# Patient Record
Sex: MALE | Race: BLACK OR AFRICAN AMERICAN | NOT HISPANIC OR LATINO | Employment: FULL TIME | URBAN - METROPOLITAN AREA
[De-identification: names, ages, dates, MRNs, and addresses within clinical notes are randomized per-mention and may not be internally consistent; named-entity substitution may affect disease eponyms.]

---

## 2021-07-19 ENCOUNTER — IMMUNIZATION (OUTPATIENT)
Dept: PRIMARY CARE CLINIC | Facility: CLINIC | Age: 51
End: 2021-07-19

## 2021-07-19 DIAGNOSIS — Z23 NEED FOR VACCINATION: Primary | ICD-10-CM

## 2021-07-19 PROCEDURE — 91300 COVID-19, MRNA, LNP-S, PF, 30 MCG/0.3 ML DOSE VACCINE: ICD-10-PCS | Mod: S$GLB,,, | Performed by: FAMILY MEDICINE

## 2021-07-19 PROCEDURE — 0001A COVID-19, MRNA, LNP-S, PF, 30 MCG/0.3 ML DOSE VACCINE: ICD-10-PCS | Mod: CV19,S$GLB,, | Performed by: FAMILY MEDICINE

## 2021-07-19 PROCEDURE — 91300 COVID-19, MRNA, LNP-S, PF, 30 MCG/0.3 ML DOSE VACCINE: CPT | Mod: S$GLB,,, | Performed by: FAMILY MEDICINE

## 2021-07-19 PROCEDURE — 0001A COVID-19, MRNA, LNP-S, PF, 30 MCG/0.3 ML DOSE VACCINE: CPT | Mod: CV19,S$GLB,, | Performed by: FAMILY MEDICINE

## 2021-08-09 ENCOUNTER — IMMUNIZATION (OUTPATIENT)
Dept: PRIMARY CARE CLINIC | Facility: CLINIC | Age: 51
End: 2021-08-09

## 2021-08-09 DIAGNOSIS — Z23 NEED FOR VACCINATION: Primary | ICD-10-CM

## 2021-08-09 PROCEDURE — 91300 COVID-19, MRNA, LNP-S, PF, 30 MCG/0.3 ML DOSE VACCINE: CPT | Mod: S$GLB,,, | Performed by: FAMILY MEDICINE

## 2021-08-09 PROCEDURE — 0002A COVID-19, MRNA, LNP-S, PF, 30 MCG/0.3 ML DOSE VACCINE: ICD-10-PCS | Mod: CV19,S$GLB,, | Performed by: FAMILY MEDICINE

## 2021-08-09 PROCEDURE — 91300 COVID-19, MRNA, LNP-S, PF, 30 MCG/0.3 ML DOSE VACCINE: ICD-10-PCS | Mod: S$GLB,,, | Performed by: FAMILY MEDICINE

## 2021-08-09 PROCEDURE — 0002A COVID-19, MRNA, LNP-S, PF, 30 MCG/0.3 ML DOSE VACCINE: CPT | Mod: CV19,S$GLB,, | Performed by: FAMILY MEDICINE

## 2022-02-10 ENCOUNTER — TELEPHONE (OUTPATIENT)
Dept: ENDOSCOPY | Facility: HOSPITAL | Age: 52
End: 2022-02-10
Payer: MEDICAID

## 2022-04-01 ENCOUNTER — OFFICE VISIT (OUTPATIENT)
Dept: PRIMARY CARE CLINIC | Facility: CLINIC | Age: 52
End: 2022-04-01
Payer: MEDICAID

## 2022-04-01 ENCOUNTER — LAB VISIT (OUTPATIENT)
Dept: LAB | Facility: HOSPITAL | Age: 52
End: 2022-04-01
Attending: NURSE PRACTITIONER
Payer: MEDICAID

## 2022-04-01 VITALS
HEIGHT: 74 IN | WEIGHT: 276.81 LBS | SYSTOLIC BLOOD PRESSURE: 153 MMHG | DIASTOLIC BLOOD PRESSURE: 77 MMHG | HEART RATE: 67 BPM | OXYGEN SATURATION: 96 % | BODY MASS INDEX: 35.53 KG/M2 | TEMPERATURE: 98 F

## 2022-04-01 DIAGNOSIS — Z00.00 GENERAL MEDICAL EXAM: ICD-10-CM

## 2022-04-01 DIAGNOSIS — Z13.6 ENCOUNTER FOR LIPID SCREENING FOR CARDIOVASCULAR DISEASE: ICD-10-CM

## 2022-04-01 DIAGNOSIS — Z86.39 HISTORY OF METABOLIC AND NUTRITIONAL DISORDER: ICD-10-CM

## 2022-04-01 DIAGNOSIS — Z11.4 SCREENING FOR HIV (HUMAN IMMUNODEFICIENCY VIRUS): ICD-10-CM

## 2022-04-01 DIAGNOSIS — Z13.220 ENCOUNTER FOR LIPID SCREENING FOR CARDIOVASCULAR DISEASE: ICD-10-CM

## 2022-04-01 DIAGNOSIS — Z11.59 ENCOUNTER FOR HEPATITIS C SCREENING TEST FOR LOW RISK PATIENT: ICD-10-CM

## 2022-04-01 DIAGNOSIS — Z12.12 SCREENING FOR COLORECTAL CANCER: ICD-10-CM

## 2022-04-01 DIAGNOSIS — M79.672 LEFT FOOT PAIN: Primary | ICD-10-CM

## 2022-04-01 DIAGNOSIS — Z12.5 ENCOUNTER FOR PROSTATE CANCER SCREENING: ICD-10-CM

## 2022-04-01 DIAGNOSIS — Z12.11 SCREENING FOR COLORECTAL CANCER: ICD-10-CM

## 2022-04-01 LAB
BASOPHILS # BLD AUTO: 0.02 K/UL (ref 0–0.2)
BASOPHILS NFR BLD: 0.3 % (ref 0–1.9)
DIFFERENTIAL METHOD: NORMAL
EOSINOPHIL # BLD AUTO: 0.2 K/UL (ref 0–0.5)
EOSINOPHIL NFR BLD: 2.9 % (ref 0–8)
ERYTHROCYTE [DISTWIDTH] IN BLOOD BY AUTOMATED COUNT: 12.5 % (ref 11.5–14.5)
HCT VFR BLD AUTO: 44.2 % (ref 40–54)
HGB BLD-MCNC: 14.9 G/DL (ref 14–18)
IMM GRANULOCYTES # BLD AUTO: 0 K/UL (ref 0–0.04)
IMM GRANULOCYTES NFR BLD AUTO: 0 % (ref 0–0.5)
LYMPHOCYTES # BLD AUTO: 1.6 K/UL (ref 1–4.8)
LYMPHOCYTES NFR BLD: 24.7 % (ref 18–48)
MCH RBC QN AUTO: 29.8 PG (ref 27–31)
MCHC RBC AUTO-ENTMCNC: 33.7 G/DL (ref 32–36)
MCV RBC AUTO: 88 FL (ref 82–98)
MONOCYTES # BLD AUTO: 0.7 K/UL (ref 0.3–1)
MONOCYTES NFR BLD: 9.9 % (ref 4–15)
NEUTROPHILS # BLD AUTO: 4.1 K/UL (ref 1.8–7.7)
NEUTROPHILS NFR BLD: 62.2 % (ref 38–73)
NRBC BLD-RTO: 0 /100 WBC
PLATELET # BLD AUTO: 238 K/UL (ref 150–450)
PMV BLD AUTO: 9.7 FL (ref 9.2–12.9)
RBC # BLD AUTO: 5 M/UL (ref 4.6–6.2)
WBC # BLD AUTO: 6.59 K/UL (ref 3.9–12.7)

## 2022-04-01 PROCEDURE — 80061 LIPID PANEL: CPT | Performed by: NURSE PRACTITIONER

## 2022-04-01 PROCEDURE — 3078F PR MOST RECENT DIASTOLIC BLOOD PRESSURE < 80 MM HG: ICD-10-PCS | Mod: CPTII,,, | Performed by: NURSE PRACTITIONER

## 2022-04-01 PROCEDURE — 85025 COMPLETE CBC W/AUTO DIFF WBC: CPT | Performed by: NURSE PRACTITIONER

## 2022-04-01 PROCEDURE — 3077F PR MOST RECENT SYSTOLIC BLOOD PRESSURE >= 140 MM HG: ICD-10-PCS | Mod: CPTII,,, | Performed by: NURSE PRACTITIONER

## 2022-04-01 PROCEDURE — 99999 PR PBB SHADOW E&M-EST. PATIENT-LVL IV: ICD-10-PCS | Mod: PBBFAC,,, | Performed by: NURSE PRACTITIONER

## 2022-04-01 PROCEDURE — 84443 ASSAY THYROID STIM HORMONE: CPT | Performed by: NURSE PRACTITIONER

## 2022-04-01 PROCEDURE — 80053 COMPREHEN METABOLIC PANEL: CPT | Performed by: NURSE PRACTITIONER

## 2022-04-01 PROCEDURE — 84439 ASSAY OF FREE THYROXINE: CPT | Performed by: NURSE PRACTITIONER

## 2022-04-01 PROCEDURE — 36415 COLL VENOUS BLD VENIPUNCTURE: CPT | Mod: PN | Performed by: NURSE PRACTITIONER

## 2022-04-01 PROCEDURE — 83036 HEMOGLOBIN GLYCOSYLATED A1C: CPT | Performed by: NURSE PRACTITIONER

## 2022-04-01 PROCEDURE — 87389 HIV-1 AG W/HIV-1&-2 AB AG IA: CPT | Performed by: NURSE PRACTITIONER

## 2022-04-01 PROCEDURE — 1159F MED LIST DOCD IN RCRD: CPT | Mod: CPTII,,, | Performed by: NURSE PRACTITIONER

## 2022-04-01 PROCEDURE — 3008F PR BODY MASS INDEX (BMI) DOCUMENTED: ICD-10-PCS | Mod: CPTII,,, | Performed by: NURSE PRACTITIONER

## 2022-04-01 PROCEDURE — 99203 OFFICE O/P NEW LOW 30 MIN: CPT | Mod: S$PBB,,, | Performed by: NURSE PRACTITIONER

## 2022-04-01 PROCEDURE — 99203 PR OFFICE/OUTPT VISIT, NEW, LEVL III, 30-44 MIN: ICD-10-PCS | Mod: S$PBB,,, | Performed by: NURSE PRACTITIONER

## 2022-04-01 PROCEDURE — 84153 ASSAY OF PSA TOTAL: CPT | Performed by: NURSE PRACTITIONER

## 2022-04-01 PROCEDURE — 3078F DIAST BP <80 MM HG: CPT | Mod: CPTII,,, | Performed by: NURSE PRACTITIONER

## 2022-04-01 PROCEDURE — 3077F SYST BP >= 140 MM HG: CPT | Mod: CPTII,,, | Performed by: NURSE PRACTITIONER

## 2022-04-01 PROCEDURE — 99214 OFFICE O/P EST MOD 30 MIN: CPT | Mod: PBBFAC,PN | Performed by: NURSE PRACTITIONER

## 2022-04-01 PROCEDURE — 99999 PR PBB SHADOW E&M-EST. PATIENT-LVL IV: CPT | Mod: PBBFAC,,, | Performed by: NURSE PRACTITIONER

## 2022-04-01 PROCEDURE — 3008F BODY MASS INDEX DOCD: CPT | Mod: CPTII,,, | Performed by: NURSE PRACTITIONER

## 2022-04-01 PROCEDURE — 86803 HEPATITIS C AB TEST: CPT | Performed by: NURSE PRACTITIONER

## 2022-04-01 PROCEDURE — 1159F PR MEDICATION LIST DOCUMENTED IN MEDICAL RECORD: ICD-10-PCS | Mod: CPTII,,, | Performed by: NURSE PRACTITIONER

## 2022-04-01 RX ORDER — GUAIFENESIN 600 MG/1
1200 TABLET, EXTENDED RELEASE ORAL 2 TIMES DAILY
COMMUNITY

## 2022-04-01 NOTE — PROGRESS NOTES
Subjective:       Patient ID: Franco Pinto is a 51 y.o. male.    Chief Complaint: Establish Care and Foot Pain      History of Present Illness:   Franco Pinto 51 y.o. male presents today to establish care and right foot/heel pain that has been present for about 3 weeks. Denies any other problems or concerns at this time.     History reviewed. No pertinent past medical history.  History reviewed. No pertinent family history.  Social History     Socioeconomic History    Marital status: Unknown   Tobacco Use    Smoking status: Never Smoker    Smokeless tobacco: Never Used     Social Determinants of Health     Financial Resource Strain: Low Risk     Difficulty of Paying Living Expenses: Not hard at all   Food Insecurity: No Food Insecurity    Worried About Running Out of Food in the Last Year: Never true    Ran Out of Food in the Last Year: Never true   Transportation Needs: No Transportation Needs    Lack of Transportation (Medical): No    Lack of Transportation (Non-Medical): No   Physical Activity: Inactive    Days of Exercise per Week: 0 days    Minutes of Exercise per Session: 0 min   Stress: No Stress Concern Present    Feeling of Stress : Not at all   Social Connections: Moderately Integrated    Frequency of Communication with Friends and Family: Twice a week    Frequency of Social Gatherings with Friends and Family: Once a week    Attends Moravian Services: 1 to 4 times per year    Active Member of Clubs or Organizations: Yes    Attends Club or Organization Meetings: Never    Marital Status: Never    Housing Stability: Unknown    Unable to Pay for Housing in the Last Year: No    Unstable Housing in the Last Year: No     Outpatient Encounter Medications as of 4/1/2022   Medication Sig Dispense Refill    guaiFENesin (MUCINEX) 600 mg 12 hr tablet Take 1,200 mg by mouth 2 (two) times daily.       No facility-administered encounter medications on file as of 4/1/2022.       Review of  "Systems   Constitutional: Negative for appetite change, chills and fever.   HENT: Negative for ear pain, sinus pressure, sore throat and trouble swallowing.    Eyes: Negative for visual disturbance.   Respiratory: Negative for shortness of breath.    Cardiovascular: Negative for chest pain.   Gastrointestinal: Negative for abdominal pain, diarrhea, nausea and vomiting.   Endocrine: Negative for cold intolerance, polyphagia and polyuria.   Genitourinary: Negative for decreased urine volume and dysuria.   Musculoskeletal: Negative for back pain.        Right foot pain     Skin: Negative for rash.   Allergic/Immunologic: Negative for environmental allergies and food allergies.   Neurological: Negative for dizziness, tremors, weakness and numbness.   Hematological: Does not bruise/bleed easily.   Psychiatric/Behavioral: Negative for confusion and hallucinations. The patient is not nervous/anxious and is not hyperactive.    All other systems reviewed and are negative.      Objective:      BP (!) 153/77   Pulse 67   Temp 98.2 °F (36.8 °C) (Temporal)   Ht 6' 2" (1.88 m)   Wt 125.6 kg (276 lb 12.8 oz)   SpO2 96%   BMI 35.54 kg/m²   Physical Exam  Vitals and nursing note reviewed.   Constitutional:       Appearance: He is well-developed.   HENT:      Head: Normocephalic.      Right Ear: External ear normal.      Left Ear: External ear normal.      Nose: Nose normal.   Eyes:      Conjunctiva/sclera: Conjunctivae normal.      Pupils: Pupils are equal, round, and reactive to light.   Neck:      Vascular: No JVD.   Cardiovascular:      Rate and Rhythm: Normal rate and regular rhythm.      Heart sounds: Normal heart sounds.   Pulmonary:      Effort: Pulmonary effort is normal.      Breath sounds: Normal breath sounds. No wheezing.   Abdominal:      General: Bowel sounds are normal.      Palpations: Abdomen is soft.      Tenderness: There is no abdominal tenderness.   Musculoskeletal:      Cervical back: Normal range of " motion and neck supple.      Right foot: Tenderness present.      Left foot: Normal.        Feet:    Lymphadenopathy:      Cervical: No cervical adenopathy.   Skin:     General: Skin is warm and dry.   Neurological:      Mental Status: He is alert and oriented to person, place, and time.   Psychiatric:         Behavior: Behavior normal.         Thought Content: Thought content normal.         Judgment: Judgment normal.         No results found for this or any previous visit.  Assessment:       1. Left foot pain    2. Screening for HIV (human immunodeficiency virus)    3. General medical exam    4. Encounter for hepatitis C screening test for low risk patient    5. Encounter for prostate cancer screening    6. History of metabolic and nutritional disorder    7. Encounter for lipid screening for cardiovascular disease    8. Screening for colorectal cancer        Plan:   Franco was seen today for establish care and foot pain.    Diagnoses and all orders for this visit:    Left foot pain  -     X-Ray Foot AP Bilateral; Future  -     Ambulatory referral/consult to Podiatry; Future    Screening for HIV (human immunodeficiency virus)  -     HIV 1/2 Ag/Ab (4th Gen); Future    General medical exam  -     CBC Auto Differential; Future  -     Comprehensive Metabolic Panel; Future    Encounter for hepatitis C screening test for low risk patient  -     Hepatitis C Antibody; Future    Encounter for prostate cancer screening  -     PSA, Screening; Future    History of metabolic and nutritional disorder  -     TSH; Future  -     T4, Free; Future  -     Hemoglobin A1C; Future    Encounter for lipid screening for cardiovascular disease  -     Lipid Panel; Future    Screening for colorectal cancer  -     Case Request Endoscopy: COLONOSCOPY             [facility]   [address]  [place] [address]  Office [phone]  Fax [phone]

## 2022-04-02 LAB
ALBUMIN SERPL BCP-MCNC: 3.7 G/DL (ref 3.5–5.2)
ALP SERPL-CCNC: 99 U/L (ref 55–135)
ALT SERPL W/O P-5'-P-CCNC: 35 U/L (ref 10–44)
ANION GAP SERPL CALC-SCNC: 6 MMOL/L (ref 8–16)
AST SERPL-CCNC: 26 U/L (ref 10–40)
BILIRUB SERPL-MCNC: 0.5 MG/DL (ref 0.1–1)
BUN SERPL-MCNC: 14 MG/DL (ref 6–20)
CALCIUM SERPL-MCNC: 9.2 MG/DL (ref 8.7–10.5)
CHLORIDE SERPL-SCNC: 108 MMOL/L (ref 95–110)
CHOLEST SERPL-MCNC: 178 MG/DL (ref 120–199)
CHOLEST/HDLC SERPL: 5.7 {RATIO} (ref 2–5)
CO2 SERPL-SCNC: 26 MMOL/L (ref 23–29)
COMPLEXED PSA SERPL-MCNC: 0.39 NG/ML (ref 0–4)
CREAT SERPL-MCNC: 0.9 MG/DL (ref 0.5–1.4)
EST. GFR  (AFRICAN AMERICAN): >60 ML/MIN/1.73 M^2
EST. GFR  (NON AFRICAN AMERICAN): >60 ML/MIN/1.73 M^2
ESTIMATED AVG GLUCOSE: 108 MG/DL (ref 68–131)
GLUCOSE SERPL-MCNC: 93 MG/DL (ref 70–110)
HBA1C MFR BLD: 5.4 % (ref 4–5.6)
HDLC SERPL-MCNC: 31 MG/DL (ref 40–75)
HDLC SERPL: 17.4 % (ref 20–50)
LDLC SERPL CALC-MCNC: 129 MG/DL (ref 63–159)
NONHDLC SERPL-MCNC: 147 MG/DL
POTASSIUM SERPL-SCNC: 3.9 MMOL/L (ref 3.5–5.1)
PROT SERPL-MCNC: 7.2 G/DL (ref 6–8.4)
SODIUM SERPL-SCNC: 140 MMOL/L (ref 136–145)
T4 FREE SERPL-MCNC: 0.8 NG/DL (ref 0.71–1.51)
TRIGL SERPL-MCNC: 90 MG/DL (ref 30–150)
TSH SERPL DL<=0.005 MIU/L-ACNC: 1.19 UIU/ML (ref 0.4–4)

## 2022-04-04 ENCOUNTER — HOSPITAL ENCOUNTER (OUTPATIENT)
Dept: RADIOLOGY | Facility: HOSPITAL | Age: 52
Discharge: HOME | End: 2022-04-04
Attending: NURSE PRACTITIONER
Payer: MEDICAID

## 2022-04-04 ENCOUNTER — TELEPHONE (OUTPATIENT)
Dept: PRIMARY CARE CLINIC | Facility: CLINIC | Age: 52
End: 2022-04-04
Payer: MEDICAID

## 2022-04-04 DIAGNOSIS — M79.672 LEFT FOOT PAIN: ICD-10-CM

## 2022-04-04 LAB
HCV AB SERPL QL IA: NEGATIVE
HIV 1+2 AB+HIV1 P24 AG SERPL QL IA: NEGATIVE

## 2022-04-04 PROCEDURE — 73620 X-RAY EXAM OF FOOT: CPT | Mod: 26,50,, | Performed by: RADIOLOGY

## 2022-04-04 PROCEDURE — 73620 X-RAY EXAM OF FOOT: CPT | Mod: TC,50,PN

## 2022-04-04 PROCEDURE — 73620 XR FOOT AP BILAT: ICD-10-PCS | Mod: 26,50,, | Performed by: RADIOLOGY

## 2022-04-07 ENCOUNTER — TELEPHONE (OUTPATIENT)
Dept: PRIMARY CARE CLINIC | Facility: CLINIC | Age: 52
End: 2022-04-07
Payer: MEDICAID

## 2022-04-07 ENCOUNTER — OFFICE VISIT (OUTPATIENT)
Dept: PODIATRY | Facility: CLINIC | Age: 52
End: 2022-04-07
Payer: MEDICAID

## 2022-04-07 DIAGNOSIS — M79.672 LEFT FOOT PAIN: Primary | ICD-10-CM

## 2022-04-07 DIAGNOSIS — M72.2 PLANTAR FASCIITIS: ICD-10-CM

## 2022-04-07 DIAGNOSIS — E66.01 SEVERE OBESITY: ICD-10-CM

## 2022-04-07 PROCEDURE — 1160F RVW MEDS BY RX/DR IN RCRD: CPT | Mod: CPTII,,, | Performed by: PODIATRIST

## 2022-04-07 PROCEDURE — 99999 PR PBB SHADOW E&M-EST. PATIENT-LVL II: ICD-10-PCS | Mod: PBBFAC,,, | Performed by: PODIATRIST

## 2022-04-07 PROCEDURE — 99204 OFFICE O/P NEW MOD 45 MIN: CPT | Mod: S$PBB,,, | Performed by: PODIATRIST

## 2022-04-07 PROCEDURE — 3044F HG A1C LEVEL LT 7.0%: CPT | Mod: CPTII,,, | Performed by: PODIATRIST

## 2022-04-07 PROCEDURE — 1159F MED LIST DOCD IN RCRD: CPT | Mod: CPTII,,, | Performed by: PODIATRIST

## 2022-04-07 PROCEDURE — 99204 PR OFFICE/OUTPT VISIT, NEW, LEVL IV, 45-59 MIN: ICD-10-PCS | Mod: S$PBB,,, | Performed by: PODIATRIST

## 2022-04-07 PROCEDURE — 99999 PR PBB SHADOW E&M-EST. PATIENT-LVL II: CPT | Mod: PBBFAC,,, | Performed by: PODIATRIST

## 2022-04-07 PROCEDURE — 3044F PR MOST RECENT HEMOGLOBIN A1C LEVEL <7.0%: ICD-10-PCS | Mod: CPTII,,, | Performed by: PODIATRIST

## 2022-04-07 PROCEDURE — 1159F PR MEDICATION LIST DOCUMENTED IN MEDICAL RECORD: ICD-10-PCS | Mod: CPTII,,, | Performed by: PODIATRIST

## 2022-04-07 PROCEDURE — 99212 OFFICE O/P EST SF 10 MIN: CPT | Mod: PBBFAC | Performed by: PODIATRIST

## 2022-04-07 PROCEDURE — 1160F PR REVIEW ALL MEDS BY PRESCRIBER/CLIN PHARMACIST DOCUMENTED: ICD-10-PCS | Mod: CPTII,,, | Performed by: PODIATRIST

## 2022-04-07 RX ORDER — MELOXICAM 15 MG/1
15 TABLET ORAL DAILY
Qty: 15 TABLET | Refills: 0 | Status: SHIPPED | OUTPATIENT
Start: 2022-04-07 | End: 2023-07-27

## 2022-04-07 NOTE — TELEPHONE ENCOUNTER
----- Message from Chalino Ramirez DNP sent at 4/4/2022  3:00 PM CDT -----  Please notify patient of the following Xray of bilateral feet:   mild associated degenerative findings present at the great toe MTP joint of bilateral feet.

## 2022-04-07 NOTE — PROGRESS NOTES
Subjective:       Patient ID: Franco Pinto is a 51 y.o. male.    Chief Complaint: Heel Pain (Patient complains of 4/10 pain at present to bilateral heels. He states the pain is worse upon waking. )      HPI: Franco Pinto complains of mild to moderate pains to the right and left foot. States pains are sharp and stabbing-like in nature. Pains are to the plantar foot, mostly with walking and standing. Rates the pains at approx. 4/10. States post-static dyskinesia. Denies any recent identifiable trauma. Does limp with gait. No NSAID medications thus far. Pains have been present for the past several weeks to months and the pains have worsened over the past couple of weeks. States walking and standing causes and/or exacerbates the symptoms.  Patient's Primary Care Provider is [facility].     Review of patient's allergies indicates:  No Known Allergies    History reviewed. No pertinent past medical history.    History reviewed. No pertinent family history.    Social History     Socioeconomic History    Marital status: Single   Tobacco Use    Smoking status: Never Smoker    Smokeless tobacco: Never Used     Social Determinants of Health     Financial Resource Strain: Low Risk     Difficulty of Paying Living Expenses: Not hard at all   Food Insecurity: No Food Insecurity    Worried About Running Out of Food in the Last Year: Never true    Ran Out of Food in the Last Year: Never true   Transportation Needs: No Transportation Needs    Lack of Transportation (Medical): No    Lack of Transportation (Non-Medical): No   Physical Activity: Inactive    Days of Exercise per Week: 0 days    Minutes of Exercise per Session: 0 min   Stress: No Stress Concern Present    Feeling of Stress : Not at all   Social Connections: Moderately Integrated    Frequency of Communication with Friends and Family: Twice a week    Frequency of Social Gatherings with Friends and Family: Once a week    Attends Bahai Services: 1  to 4 times per year    Active Member of Clubs or Organizations: Yes    Attends Club or Organization Meetings: Never    Marital Status: Never    Housing Stability: Unknown    Unable to Pay for Housing in the Last Year: No    Unstable Housing in the Last Year: No       History reviewed. No pertinent surgical history.    Review of Systems      Objective:   There were no vitals taken for this visit.    X-Ray Foot AP Bilateral  Narrative: EXAMINATION:  XR FOOT AP BILAT    CLINICAL HISTORY:  Pain in left foot    TECHNIQUE:  AP, lateral, and oblique views of both feet were performed.    COMPARISON:  None    FINDINGS:  Right: There is no radiographic evidence of acute osseous, articular, or soft tissue abnormality.  There is mild hallux valgus deformity with mild associated degenerative findings present at the great toe MTP joint.    Left: There is no radiographic evidence of acute osseous, articular, or soft tissue abnormality.  There is mild hallux valgus deformity with mild associated degenerative findings present at the great toe MTP joint.  Impression: As above.  No acute findings.    Electronically signed by: Stuart Camejo MD  Date:    04/04/2022  Time:    09:13      Physical Exam  LOWER EXTREMITY PHYSICAL EXAMINATION    VASCULAR: The right DP pulse is 2/4 and the left DP is 2/4. The right PT pulse is 2/4 and the left PT pulse is 2/4. Proximal to distal, warm to warm. No dependent rubor or elevation palor is noted. Capillary refill time is less than 3 seconds. Hair growth is appreciated to the dorsal foot and digits.    NEUROLOGY: Proprioception is intact, bilateral. Sensation to light touch is intact. Negative Tinel's Sign and negative Valleix Sign. No neurological sensations with compression of the area of Finney's Nerve in the area of the Abductor Hallucis muscle belly.    ORTHOPEDIC:  Moderate tenderness to palpation of the area around the plantar medial calcaneal tubercle on the right and left  foot. Pains are characterized as sharp and stabbing-like with direct palpation of the area. There is also mild pain to palpation of the immediate plantar aspect of the heel, and no pains to the lateral band of the fascia. No edema is noted. No fullness is noted. No pains or defects are noted within the plantar fascia at the arch. No plantar fibromas are noted. No defects are noted within the ligament. Dorsiflexion of the MTPJs with simultaneous palpation of the fascia at the arch, does worsen and exacerbate the pains. No pains with medial to lateral compression of the heel. Equinus contracture is noted. Antalgic gait pattern is noted.     DERMATOLOGY: No ecchymosis is noted.  Skin is supple, dry and intact. Skin is supple.  No hyperkeratosis noted. No calluses.  No open wounds or ulcerations are noted.  No palpable plantar fibromas noted.    Assessment:     1. Left foot pain    2. Plantar fasciitis    3. Severe obesity          Plan:     Left foot pain  -     Ambulatory referral/consult to Podiatry    Plantar fasciitis    Severe obesity    Other orders  -     meloxicam (MOBIC) 15 MG tablet; Take 1 tablet (15 mg total) by mouth once daily.  Dispense: 15 tablet; Refill: 0        Thorough discussion is had with the patient today concerning the diagnosis, its etiology, and the treatment algorithm at present.    I explained to the patient that etiology and all treatment options for heel pain including rest,  ice messages, stretching exercises, strappings/tappings, NSAID's, injections, new shoegear with orthotic inserts, and/or surgical treatment. I also discussed a possible injection of steroid to help calm down the inflammation sooner. I expressed the importance of wearing the orthotics in culmination of other treatment modalities. Patient agreed to stretching exercises and inserts. I gave written and verbal instructions on heel cord stretching and this was demonstrated for the patient. Patient expressed understanding  and had the patient teach back the instructions.  Patient instructed on adequate icing techniques which should be done for acute pain and inflammation.    Discussed the importance of stretching to the posterior muscle groups of the gastrocnemius and the soleus.  A stretching sheet was provided to the patient in conjunction with a Thera-Band.  I do recommend patient perform stretching exercises 4-6 times per day and holding the stretches for approximately 15-30 seconds apiece.  We discussed importance of stretching as relates to lengthening the posterior muscle group which can decrease drain on the posterior aspect of the heel as well as the plantar aspect of the heel.  This will also decrease pain associated with post static dyskinesia.  Teach back mechanism was performed with the patient demonstrating the stretching exercises.    A prescription was provided to the patient for orthotics.  We discussed the importance of wearing the orthotics to help elevate the arch which will allow for tension to be lessened to the plantar fascia and posterior heel cord.  Discussed the importance of the break-in period with the inserts by wearing them 2-3 hours for the 1st day and increasing their use an hour each day until able to tolerate a full work period.          No future appointments.

## 2022-04-07 NOTE — TELEPHONE ENCOUNTER
----- Message from Chalino Ramirez DNP sent at 4/4/2022  2:54 PM CDT -----  Please notify patient that labs were good and no additional treatment needed at this time.

## 2022-04-07 NOTE — TELEPHONE ENCOUNTER
Spoke with pt inform of xray review by OMAR Ramirez DNP ; pt verbalized understanding and advices to keep schedule appt on 04/07/22 @ 3:15 pm today

## 2022-05-26 RX ORDER — SODIUM, POTASSIUM,MAG SULFATES 17.5-3.13G
1 SOLUTION, RECONSTITUTED, ORAL ORAL DAILY
Qty: 1 KIT | Refills: 0 | Status: SHIPPED | OUTPATIENT
Start: 2022-05-26 | End: 2022-05-28

## 2022-07-07 ENCOUNTER — ANESTHESIA (OUTPATIENT)
Dept: ENDOSCOPY | Facility: HOSPITAL | Age: 52
End: 2022-07-07
Payer: MEDICAID

## 2022-07-07 ENCOUNTER — ANESTHESIA EVENT (OUTPATIENT)
Dept: ENDOSCOPY | Facility: HOSPITAL | Age: 52
End: 2022-07-07
Payer: MEDICAID

## 2022-07-07 ENCOUNTER — HOSPITAL ENCOUNTER (OUTPATIENT)
Facility: HOSPITAL | Age: 52
Discharge: HOME | End: 2022-07-07
Attending: INTERNAL MEDICINE | Admitting: INTERNAL MEDICINE
Payer: MEDICAID

## 2022-07-07 DIAGNOSIS — Z12.11 COLON CANCER SCREENING: Primary | ICD-10-CM

## 2022-07-07 PROCEDURE — 88305 TISSUE EXAM BY PATHOLOGIST: CPT | Mod: 26,,, | Performed by: PATHOLOGY

## 2022-07-07 PROCEDURE — 37000008 HC ANESTHESIA 1ST 15 MINUTES: Performed by: INTERNAL MEDICINE

## 2022-07-07 PROCEDURE — 37000009 HC ANESTHESIA EA ADD 15 MINS: Performed by: INTERNAL MEDICINE

## 2022-07-07 PROCEDURE — 88305 TISSUE EXAM BY PATHOLOGIST: CPT | Performed by: PATHOLOGY

## 2022-07-07 PROCEDURE — 27201089 HC SNARE, DISP (ANY): Performed by: INTERNAL MEDICINE

## 2022-07-07 PROCEDURE — 45380 COLONOSCOPY AND BIOPSY: CPT | Performed by: INTERNAL MEDICINE

## 2022-07-07 PROCEDURE — 45385 PR COLONOSCOPY,REMV LESN,SNARE: ICD-10-PCS | Mod: ,,, | Performed by: INTERNAL MEDICINE

## 2022-07-07 PROCEDURE — 00811 ANES LWR INTST NDSC NOS: CPT | Performed by: INTERNAL MEDICINE

## 2022-07-07 PROCEDURE — 45380 COLONOSCOPY AND BIOPSY: CPT | Mod: 59,,, | Performed by: INTERNAL MEDICINE

## 2022-07-07 PROCEDURE — 25000003 PHARM REV CODE 250: Performed by: NURSE ANESTHETIST, CERTIFIED REGISTERED

## 2022-07-07 PROCEDURE — 45385 COLONOSCOPY W/LESION REMOVAL: CPT | Performed by: INTERNAL MEDICINE

## 2022-07-07 PROCEDURE — 45380 PR COLONOSCOPY,BIOPSY: ICD-10-PCS | Mod: 59,,, | Performed by: INTERNAL MEDICINE

## 2022-07-07 PROCEDURE — 27201012 HC FORCEPS, HOT/COLD, DISP: Performed by: INTERNAL MEDICINE

## 2022-07-07 PROCEDURE — 45385 COLONOSCOPY W/LESION REMOVAL: CPT | Mod: ,,, | Performed by: INTERNAL MEDICINE

## 2022-07-07 PROCEDURE — 88305 TISSUE EXAM BY PATHOLOGIST: ICD-10-PCS | Mod: 26,,, | Performed by: PATHOLOGY

## 2022-07-07 PROCEDURE — 63600175 PHARM REV CODE 636 W HCPCS: Performed by: NURSE ANESTHETIST, CERTIFIED REGISTERED

## 2022-07-07 RX ORDER — LIDOCAINE HYDROCHLORIDE 10 MG/ML
INJECTION, SOLUTION EPIDURAL; INFILTRATION; INTRACAUDAL; PERINEURAL
Status: DISCONTINUED | OUTPATIENT
Start: 2022-07-07 | End: 2022-07-07

## 2022-07-07 RX ORDER — PROPOFOL 10 MG/ML
VIAL (ML) INTRAVENOUS
Status: DISCONTINUED | OUTPATIENT
Start: 2022-07-07 | End: 2022-07-07

## 2022-07-07 RX ORDER — SODIUM CHLORIDE, SODIUM LACTATE, POTASSIUM CHLORIDE, CALCIUM CHLORIDE 600; 310; 30; 20 MG/100ML; MG/100ML; MG/100ML; MG/100ML
INJECTION, SOLUTION INTRAVENOUS CONTINUOUS
Status: DISCONTINUED | OUTPATIENT
Start: 2022-07-07 | End: 2022-07-07 | Stop reason: HOSPADM

## 2022-07-07 RX ORDER — SODIUM CHLORIDE, SODIUM LACTATE, POTASSIUM CHLORIDE, CALCIUM CHLORIDE 600; 310; 30; 20 MG/100ML; MG/100ML; MG/100ML; MG/100ML
INJECTION, SOLUTION INTRAVENOUS CONTINUOUS PRN
Status: DISCONTINUED | OUTPATIENT
Start: 2022-07-07 | End: 2022-07-07

## 2022-07-07 RX ADMIN — PROPOFOL 50 MG: 10 INJECTION, EMULSION INTRAVENOUS at 08:07

## 2022-07-07 RX ADMIN — SODIUM CHLORIDE, SODIUM LACTATE, POTASSIUM CHLORIDE, AND CALCIUM CHLORIDE: 600; 310; 30; 20 INJECTION, SOLUTION INTRAVENOUS at 07:07

## 2022-07-07 RX ADMIN — PROPOFOL 30 MG: 10 INJECTION, EMULSION INTRAVENOUS at 08:07

## 2022-07-07 RX ADMIN — LIDOCAINE HYDROCHLORIDE 50 MG: 10 INJECTION, SOLUTION EPIDURAL; INFILTRATION; INTRACAUDAL; PERINEURAL at 08:07

## 2022-07-07 RX ADMIN — PROPOFOL 100 MG: 10 INJECTION, EMULSION INTRAVENOUS at 08:07

## 2022-07-07 NOTE — ANESTHESIA POSTPROCEDURE EVALUATION
Anesthesia Post Evaluation    Patient: Franco Pinto    Procedure(s) Performed: Procedure(s) (LRB):  COLONOSCOPY (N/A)    Final Anesthesia Type: MAC      Patient location during evaluation: PACU  Patient participation: No - Unable to Participate, Sedation  Level of consciousness: sedated  Post-procedure vital signs: reviewed and stable  Pain management: adequate  Airway patency: patent    PONV status at discharge: No PONV  Anesthetic complications: no      Cardiovascular status: blood pressure returned to baseline and hemodynamically stable  Respiratory status: unassisted and spontaneous ventilation  Hydration status: euvolemic  Follow-up not needed.            No case tracking events are documented in the log.      Pain/Justine Score: No data recorded

## 2022-07-07 NOTE — PROVATION PATIENT INSTRUCTIONS
Discharge Summary/Instructions after an Endoscopic Procedure  Patient Name: Franco Pinto  Patient MRN: [id number]  Patient YOB: 1970 Thursday, July 7, 2022 Cata Tilley MD  Dear patient,  As a result of recent federal legislation (The Federal Cures Act), you may   receive lab or pathology results from your procedure in your [facility]   account before your physician is able to contact you. Your physician or   their representative will relay the results to you with their   recommendations at their soonest availability.  Thank you,  RESTRICTIONS:  During your procedure today, you received medications for sedation.  These   medications may affect your judgment, balance and coordination.  Therefore,   for 24 hours, you have the following restrictions:   - DO NOT drive a car, operate machinery, make legal/financial decisions,   sign important papers or drink alcohol.    ACTIVITY:  Today: no heavy lifting, straining or running due to procedural   sedation/anesthesia.  The following day: return to full activity including work.  DIET:  Eat and drink normally unless instructed otherwise.     TREATMENT FOR COMMON SIDE EFFECTS:  - Mild abdominal pain, nausea, belching, bloating or excessive gas:  rest,   eat lightly and use a heating pad.  - Sore Throat: treat with throat lozenges and/or gargle with warm salt   water.  - Because air was used during the procedure, expelling large amounts of air   from your rectum or belching is normal.  - If a bowel prep was taken, you may not have a bowel movement for 1-3 days.    This is normal.  SYMPTOMS TO WATCH FOR AND REPORT TO YOUR PHYSICIAN:  1. Abdominal pain or bloating, other than gas cramps.  2. Chest pain.  3. Back pain.  4. Signs of infection such as: chills or fever occurring within 24 hours   after the procedure.  5. Rectal bleeding, which would show as bright red, maroon, or black stools.   (A tablespoon of blood from the rectum is not serious, especially if    hemorrhoids are present.)  6. Vomiting.  7. Weakness or dizziness.  GO DIRECTLY TO THE NEAREST EMERGENCY ROOM IF YOU HAVE ANY OF THE FOLLOWING:      Difficulty breathing              Chills and/or fever over 101 F   Persistent vomiting and/or vomiting blood   Severe abdominal pain   Severe chest pain   Black, tarry stools   Bleeding- more than one tablespoon   Any other symptom or condition that you feel may need urgent attention  Your doctor recommends these additional instructions:  If any biopsies were taken, your doctors clinic will contact you in 1 to 2   weeks with any results.  - Patient has a contact number available for emergencies.  The signs and   symptoms of potential delayed complications were discussed with the   patient.  Return to normal activities tomorrow.  Written discharge   instructions were provided to the patient.   - Discharge patient to home (via wheelchair).   - Resume previous diet today.   - Continue present medications.   - Await pathology results.   - No aspirin, ibuprofen, naproxen, or other non-steroidal anti-inflammatory   drugs for 7 days after polyp removal.   - Repeat colonoscopy in 5 years for surveillance.  For questions, problems or results please call your physician Cata Tilley MD at Work:  [phone]  If you have any questions about the above instructions, call the GI   department at [phone] or call the endoscopy unit at [phone]   from 7am until 3 pm.  [facility], EMERGENCY ROOM PHONE NUMBER:   [phone]  IF A COMPLICATION OR EMERGENCY SITUATION ARISES AND YOU ARE UNABLE TO REACH   YOUR PHYSICIAN - GO DIRECTLY TO THE EMERGENCY ROOM.  I have read or have had read to me these discharge instructions for my   procedure and have received a written copy.  I understand these   instructions and will follow-up with my physician if I have any questions.     __________________________________        _____________________________________  Nurse Signature                                          Patient/Designated   Responsible Party Signature  MD Cata Levy MD  7/7/2022 8:23:00 AM  This report has been verified and signed electronically.  Dear patient,  As a result of recent federal legislation (The Federal Cures Act), you may   receive lab or pathology results from your procedure in your [facility]   account before your physician is able to contact you. Your physician or   their representative will relay the results to you with their   recommendations at their soonest availability.  Thank you,  PROVATION

## 2022-07-07 NOTE — PLAN OF CARE
Dr angela came to bedside to discuss findings. Vital signs stable, no patient c/o nausea/vomitting, no abdominal pain, no gi bleeding. Patient to be discharged from unit.

## 2022-07-07 NOTE — TRANSFER OF CARE
"Anesthesia Transfer of Care Note    Patient: Franco Pinto    Procedure(s) Performed: Procedure(s) (LRB):  COLONOSCOPY (N/A)    Patient location: PACU    Anesthesia Type: MAC    Transport from OR: Transported from OR on room air with adequate spontaneous ventilation    Post pain: adequate analgesia    Post assessment: no apparent anesthetic complications and tolerated procedure well    Post vital signs: stable    Level of consciousness: sedated    Nausea/Vomiting: no nausea/vomiting    Complications: none    Transfer of care protocol was followed      Last vitals:   Visit Vitals  BP (!) 142/75 (BP Location: Left arm, Patient Position: Lying)   Pulse 60   Temp 37 °C (98.6 °F) (Temporal)   Resp 18   Ht 6' 2" (1.88 m)   Wt 122.5 kg (270 lb)   SpO2 97%   BMI 34.67 kg/m²     "

## 2022-07-07 NOTE — ANESTHESIA PREPROCEDURE EVALUATION
07/07/2022  Franco Pinto is a 51 y.o., male.      Pre-op Assessment    I have reviewed the Patient Summary Reports.     I have reviewed the Nursing Notes. I have reviewed the NPO Status.   I have reviewed the Medications.     Review of Systems  Anesthesia Hx:  No previous Anesthesia  Denies Family Hx of Anesthesia complications.   Denies Personal Hx of Anesthesia complications.   Social:  Non-Smoker, No Alcohol Use    Hematology/Oncology:  Hematology Normal   Oncology Normal     Cardiovascular:   Denies Hypertension.  Denies MI.   Denies CABG/stent.   Denies CHF.    Pulmonary:   Denies COPD.  Denies Asthma.  Denies Sleep Apnea.    Renal/:  Renal/ Normal     Hepatic/GI:   Bowel Prep. Denies GERD. Denies Liver Disease.  Denies Hepatitis.    Musculoskeletal:  Musculoskeletal Normal    Neurological:   Denies CVA. Denies Seizures.    Endocrine:   Denies Diabetes. Denies Hypothyroidism. Denies Hyperthyroidism.  Obesity / BMI > 30      Physical Exam    Airway:  Mallampati: II   Mouth Opening: Normal    Dental:  Intact    Chest/Lungs:  Clear to auscultation, Normal Respiratory Rate    Heart:  Rate: Normal  Rhythm: Regular Rhythm        Anesthesia Plan  Type of Anesthesia, risks & benefits discussed:    Anesthesia Type: MAC  Intra-op Monitoring Plan: Standard ASA Monitors  Induction:  IV  Informed Consent: Informed consent signed with the Patient and all parties understand the risks and agree with anesthesia plan.  All questions answered.   ASA Score: 2  Day of Surgery Review of History & Physical: H&P Update referred to the surgeon/provider.    Ready For Surgery From Anesthesia Perspective.     .

## 2022-07-08 VITALS
RESPIRATION RATE: 15 BRPM | SYSTOLIC BLOOD PRESSURE: 129 MMHG | HEART RATE: 52 BPM | WEIGHT: 270 LBS | BODY MASS INDEX: 34.65 KG/M2 | TEMPERATURE: 98 F | HEIGHT: 74 IN | DIASTOLIC BLOOD PRESSURE: 73 MMHG | OXYGEN SATURATION: 96 %

## 2022-07-12 LAB
FINAL PATHOLOGIC DIAGNOSIS: NORMAL
GROSS: NORMAL
Lab: NORMAL

## 2022-07-18 ENCOUNTER — PATIENT MESSAGE (OUTPATIENT)
Dept: GASTROENTEROLOGY | Facility: CLINIC | Age: 52
End: 2022-07-18
Payer: MEDICAID

## 2023-07-20 ENCOUNTER — TELEPHONE (OUTPATIENT)
Dept: PRIMARY CARE CLINIC | Facility: CLINIC | Age: 53
End: 2023-07-20
Payer: MEDICAID

## 2023-07-20 NOTE — TELEPHONE ENCOUNTER
Pt called to schedule appointment for back pain and recurrent ear infection. Pt informed of appointment date and time, pt voiced understanding.   ----- Message from Sherin Slaughter sent at 7/20/2023  1:53 PM CDT -----  Contact: Pt [phone]  Caller is requesting an earlier appointment then we can schedule.  Caller is requesting a message be sent to the provider.  :    When is the next available appointment with their provider:  8/29/23 at 2pm it's on the waitlist.  Reason for the appointment:  Hurt back and stubborn ear infection  Patient preference of timeframe to be scheduled:  Any time and any day next week  Would the patient like a call back, or a response through their [facility] portal?:   Either  Comments:

## 2023-07-27 ENCOUNTER — HOSPITAL ENCOUNTER (OUTPATIENT)
Dept: RADIOLOGY | Facility: HOSPITAL | Age: 53
Discharge: HOME | End: 2023-07-27
Attending: NURSE PRACTITIONER
Payer: MEDICAID

## 2023-07-27 ENCOUNTER — OFFICE VISIT (OUTPATIENT)
Dept: PRIMARY CARE CLINIC | Facility: CLINIC | Age: 53
End: 2023-07-27
Payer: MEDICAID

## 2023-07-27 VITALS
OXYGEN SATURATION: 97 % | RESPIRATION RATE: 20 BRPM | WEIGHT: 279.63 LBS | HEART RATE: 75 BPM | BODY MASS INDEX: 35.89 KG/M2 | SYSTOLIC BLOOD PRESSURE: 118 MMHG | DIASTOLIC BLOOD PRESSURE: 82 MMHG | TEMPERATURE: 99 F | HEIGHT: 74 IN

## 2023-07-27 DIAGNOSIS — R20.2 PARESTHESIAS IN RIGHT HAND: ICD-10-CM

## 2023-07-27 DIAGNOSIS — M54.6 ACUTE BILATERAL THORACIC BACK PAIN: ICD-10-CM

## 2023-07-27 DIAGNOSIS — M54.42 ACUTE BILATERAL LOW BACK PAIN WITH LEFT-SIDED SCIATICA: ICD-10-CM

## 2023-07-27 DIAGNOSIS — H60.501 ACUTE OTITIS EXTERNA OF RIGHT EAR, UNSPECIFIED TYPE: Primary | ICD-10-CM

## 2023-07-27 DIAGNOSIS — H93.19 TINNITUS, UNSPECIFIED LATERALITY: ICD-10-CM

## 2023-07-27 DIAGNOSIS — H92.01 OTALGIA, RIGHT: ICD-10-CM

## 2023-07-27 PROCEDURE — 1160F RVW MEDS BY RX/DR IN RCRD: CPT | Mod: CPTII,,, | Performed by: NURSE PRACTITIONER

## 2023-07-27 PROCEDURE — 72100 X-RAY EXAM L-S SPINE 2/3 VWS: CPT | Mod: TC,PN

## 2023-07-27 PROCEDURE — 3074F PR MOST RECENT SYSTOLIC BLOOD PRESSURE < 130 MM HG: ICD-10-PCS | Mod: CPTII,,, | Performed by: NURSE PRACTITIONER

## 2023-07-27 PROCEDURE — 99214 PR OFFICE/OUTPT VISIT, EST, LEVL IV, 30-39 MIN: ICD-10-PCS | Mod: S$PBB,,, | Performed by: NURSE PRACTITIONER

## 2023-07-27 PROCEDURE — 72070 X-RAY EXAM THORAC SPINE 2VWS: CPT | Mod: TC,PN

## 2023-07-27 PROCEDURE — 72070 X-RAY EXAM THORAC SPINE 2VWS: CPT | Mod: 26,,, | Performed by: RADIOLOGY

## 2023-07-27 PROCEDURE — 99999 PR PBB SHADOW E&M-EST. PATIENT-LVL V: ICD-10-PCS | Mod: PBBFAC,,, | Performed by: NURSE PRACTITIONER

## 2023-07-27 PROCEDURE — 3008F PR BODY MASS INDEX (BMI) DOCUMENTED: ICD-10-PCS | Mod: CPTII,,, | Performed by: NURSE PRACTITIONER

## 2023-07-27 PROCEDURE — 3079F PR MOST RECENT DIASTOLIC BLOOD PRESSURE 80-89 MM HG: ICD-10-PCS | Mod: CPTII,,, | Performed by: NURSE PRACTITIONER

## 2023-07-27 PROCEDURE — 99214 OFFICE O/P EST MOD 30 MIN: CPT | Mod: S$PBB,,, | Performed by: NURSE PRACTITIONER

## 2023-07-27 PROCEDURE — 3008F BODY MASS INDEX DOCD: CPT | Mod: CPTII,,, | Performed by: NURSE PRACTITIONER

## 2023-07-27 PROCEDURE — 72070 XR THORACIC SPINE AP LATERAL: ICD-10-PCS | Mod: 26,,, | Performed by: RADIOLOGY

## 2023-07-27 PROCEDURE — 3074F SYST BP LT 130 MM HG: CPT | Mod: CPTII,,, | Performed by: NURSE PRACTITIONER

## 2023-07-27 PROCEDURE — 3079F DIAST BP 80-89 MM HG: CPT | Mod: CPTII,,, | Performed by: NURSE PRACTITIONER

## 2023-07-27 PROCEDURE — 72100 X-RAY EXAM L-S SPINE 2/3 VWS: CPT | Mod: 26,,, | Performed by: RADIOLOGY

## 2023-07-27 PROCEDURE — 99999 PR PBB SHADOW E&M-EST. PATIENT-LVL V: CPT | Mod: PBBFAC,,, | Performed by: NURSE PRACTITIONER

## 2023-07-27 PROCEDURE — 1159F MED LIST DOCD IN RCRD: CPT | Mod: CPTII,,, | Performed by: NURSE PRACTITIONER

## 2023-07-27 PROCEDURE — 1160F PR REVIEW ALL MEDS BY PRESCRIBER/CLIN PHARMACIST DOCUMENTED: ICD-10-PCS | Mod: CPTII,,, | Performed by: NURSE PRACTITIONER

## 2023-07-27 PROCEDURE — 72100 XR LUMBAR SPINE AP AND LATERAL: ICD-10-PCS | Mod: 26,,, | Performed by: RADIOLOGY

## 2023-07-27 PROCEDURE — 1159F PR MEDICATION LIST DOCUMENTED IN MEDICAL RECORD: ICD-10-PCS | Mod: CPTII,,, | Performed by: NURSE PRACTITIONER

## 2023-07-27 PROCEDURE — 99215 OFFICE O/P EST HI 40 MIN: CPT | Mod: PBBFAC,PN | Performed by: NURSE PRACTITIONER

## 2023-07-27 RX ORDER — OFLOXACIN 3 MG/ML
10 SOLUTION AURICULAR (OTIC) DAILY
Qty: 10 ML | Refills: 0 | Status: SHIPPED | OUTPATIENT
Start: 2023-07-27 | End: 2023-08-06

## 2023-07-27 RX ORDER — TIZANIDINE 2 MG/1
2 TABLET ORAL EVERY 6 HOURS PRN
Qty: 30 TABLET | Refills: 0 | Status: SHIPPED | OUTPATIENT
Start: 2023-07-27 | End: 2023-08-29 | Stop reason: SDUPTHER

## 2023-07-27 RX ORDER — IBUPROFEN 800 MG/1
800 TABLET ORAL EVERY 8 HOURS PRN
Qty: 30 TABLET | Refills: 0 | Status: SHIPPED | OUTPATIENT
Start: 2023-07-27 | End: 2023-08-29 | Stop reason: SDUPTHER

## 2023-07-27 NOTE — PROGRESS NOTES
"Subjective:      Patient ID: Franco Pinto is a 52 y.o. male.    Chief Complaint: No chief complaint on file.    /82 (BP Location: Left arm, Patient Position: Sitting, BP Method: Medium (Manual))   Pulse 75   Temp 98.5 °F (36.9 °C) (Oral)   Resp 20   Ht 6' 2" (1.88 m)   Wt 126.8 kg (279 lb 9.6 oz)   SpO2 97%   BMI 35.90 kg/m²     51 y/o male presents with follow up with right ear infection diagnosed in . States he took oral abx and had abx drops and it seemed to help for approximately two weeks then symptoms returned. Says he now has ringing in right ear and it feels "clogged". Has had some ear drainage. Says he also attempted to flush his own ear and got some of the "infection" out but still feels as if there is a lot more there. Pt also c/o upper  and lower back pain after an accident at work 2 months ago. Says she is seeing a chiropractor but they are unable to prescribe him medication. Also c/o occasional numbness and tingling in his right hand since the accident.       Review of patient's allergies indicates:  No Known Allergies     Review of Systems   Constitutional:  Negative for chills and fever.   HENT:  Negative for congestion and sore throat.    Respiratory:  Negative for cough and shortness of breath.    Cardiovascular:  Negative for chest pain and palpitations.   Gastrointestinal:  Negative for nausea and vomiting.   Genitourinary: Negative.    Skin: Negative.    Neurological:  Negative for headaches.    Objective:      Physical Exam  Vitals reviewed.   Constitutional:       Appearance: He is well-developed.   HENT:      Head: Normocephalic and atraumatic.      Right Ear: Tympanic membrane, ear canal and external ear normal. Decreased hearing noted. Drainage (in canal) present.      Left Ear: Tympanic membrane, ear canal and external ear normal. No swelling.  No middle ear effusion. Tympanic membrane is not erythematous.      Ears:      Comments: Unable to visualize right tm due to " drainage obscuring view     Nose: Nose normal. No mucosal edema or rhinorrhea.      Mouth/Throat:      Mouth: Mucous membranes are moist.      Pharynx: Uvula midline.   Eyes:      General: Lids are normal. Gaze aligned appropriately.      Extraocular Movements: Extraocular movements intact.      Conjunctiva/sclera: Conjunctivae normal.      Pupils: Pupils are equal, round, and reactive to light.   Cardiovascular:      Rate and Rhythm: Normal rate and regular rhythm.      Heart sounds: Normal heart sounds.   Pulmonary:      Effort: Pulmonary effort is normal.      Breath sounds: Normal breath sounds. No decreased breath sounds or wheezing.   Musculoskeletal:      Cervical back: Normal range of motion and neck supple.      Thoracic back: Tenderness present.      Lumbar back: Tenderness present. Positive left straight leg raise test. Negative right straight leg raise test.   Lymphadenopathy:      Cervical: No cervical adenopathy.   Skin:     General: Skin is warm and dry.      Capillary Refill: Capillary refill takes less than 2 seconds.      Coloration: Skin is not cyanotic or pale.   Neurological:      Mental Status: He is alert and oriented to person, place, and time.      Cranial Nerves: No cranial nerve deficit.   Psychiatric:         Attention and Perception: Attention normal.         Mood and Affect: Mood normal.         Speech: Speech normal.         Behavior: Behavior normal.         Thought Content: Thought content normal.         Cognition and Memory: Cognition normal.       LABS REVIEWED  No visits with results within 3 Month(s) from this visit.   Latest known visit with results is:   Admission on 07/07/2022, Discharged on 07/07/2022   Component Date Value Ref Range Status    Final Pathologic Diagnosis 07/07/2022    Final                    Value:1. Transverse colon polyp, polypectomy:  - Benign submucosal lymphoid aggregate  -  Negative  for dysplasia or malignancy  2. Descending colon polyp,  polypectomy:  - Tubular adenoma, negative for high-grade dysplasia or carcinoma      Gross 07/07/2022    Final                    Value:1:  Surgery ID:  [id number];  Pathology ID:  [id number]  1. Received in formalin labeled transverse colon polyp, is 1 fragment, 1 mm  in greatest dimension. Submitted entirely.  JOS--1-A  Grossed by: Ofelia Pfeiffer   2: Surgery ID:  [id number];  Pathology ID:  [id number]  2. Received in formalin labeled descending colon polypectomies x2, are 2  fragments, 1-3 mm in greatest dimension. Submitted entirely.  EXA--2-A  Grossed by: Ofelia Pfeiffer      Disclaimer 07/07/2022    Final                    Value:Unless the case is a 'gross only' or additional testing only, the final  diagnosis for each specimen is based on a microscopic examination of  appropriate tissue sections.        Assessment:       1. Acute otitis externa of right ear, unspecified type    2. Acute bilateral thoracic back pain    3. Acute bilateral low back pain with left-sided sciatica    4. Paresthesias in right hand    5. Otalgia, right    6. Tinnitus, unspecified laterality          Plan:     Acute otitis externa of right ear, unspecified type  -     ofloxacin (FLOXIN) 0.3 % otic solution; Place 10 drops into the right ear once daily. for 10 days  Dispense: 10 mL; Refill: 0  -     Ambulatory referral/consult to ENT; Future; Expected date: 08/03/2023    Acute bilateral thoracic back pain  -     X-Ray Thoracic Spine AP Lateral; Future; Expected date: 07/27/2023  -     ofloxacin (FLOXIN) 0.3 % otic solution; Place 10 drops into the right ear once daily. for 10 days  Dispense: 10 mL; Refill: 0  -     ibuprofen (ADVIL,MOTRIN) 800 MG tablet; Take 1 tablet (800 mg total) by mouth every 8 (eight) hours as needed for Pain.  Dispense: 30 tablet; Refill: 0  -     tiZANidine (ZANAFLEX) 2 MG tablet; Take 1 tablet (2 mg total) by mouth every 6 (six) hours as needed (back pain).  Dispense: 30 tablet; Refill: 0    Acute bilateral  low back pain with left-sided sciatica  -     X-Ray Lumbar Spine AP And Lateral; Future; Expected date: 07/27/2023  -     ofloxacin (FLOXIN) 0.3 % otic solution; Place 10 drops into the right ear once daily. for 10 days  Dispense: 10 mL; Refill: 0  -     ibuprofen (ADVIL,MOTRIN) 800 MG tablet; Take 1 tablet (800 mg total) by mouth every 8 (eight) hours as needed for Pain.  Dispense: 30 tablet; Refill: 0  -     tiZANidine (ZANAFLEX) 2 MG tablet; Take 1 tablet (2 mg total) by mouth every 6 (six) hours as needed (back pain).  Dispense: 30 tablet; Refill: 0    Paresthesias in right hand    Otalgia, right    Tinnitus, unspecified laterality  -     Ambulatory referral/consult to ENT; Future; Expected date: 08/03/2023    Right ear flushed with 50/50 warm water and hydrogen peroxide. Copious amounts of whitish colored drainage noted. Unable to flush thoroughly enough to visualize tm. Referral placed to ENT.      Use heat/ice to area for 20 minutes 3-4 times a day for comfort.  No heavy lifting.  Take ibuprofen as directed for pain. You may take tylenol instead if you are unable to take NSAIDS.  Take muscle relaxer as prescribed for muscle spasms. Avoid driving since it may cause drowsiness.  Apply analgesic rubs as directed on label to help with pain.  Go to ER if you have fever of 103 or higher, bowel/bladder incontinence, numbness, tingling, or weakness to lower extremities, or if your symptoms worsen in any way.    Follow up with ortho/PCP within 2 weeks if no improvement.

## 2023-07-31 ENCOUNTER — TELEPHONE (OUTPATIENT)
Dept: PRIMARY CARE CLINIC | Facility: CLINIC | Age: 53
End: 2023-07-31
Payer: MEDICAID

## 2023-07-31 NOTE — TELEPHONE ENCOUNTER
Called the patient to inform him that his information has been faxed off.    ----- Message from Donovan Bearden sent at 7/31/2023  9:17 AM CDT -----  Contact: Franco Gamez is needing a call back in regards to sending a referral over to [facility], fax [phone]. Please give him a call back at [phone]

## 2023-08-10 ENCOUNTER — TELEPHONE (OUTPATIENT)
Dept: PRIMARY CARE CLINIC | Facility: CLINIC | Age: 53
End: 2023-08-10
Payer: MEDICAID

## 2023-08-10 NOTE — TELEPHONE ENCOUNTER
Returned the call the recipient states that it's the wrong number apologized and call was disconnected.      ----- Message from Cornelia Layton sent at 8/10/2023  9:56 AM CDT -----  Contact: patient  Franco Pinto would like a call back at [phone], in regards to his visit with the ENT specialist.

## 2023-08-29 ENCOUNTER — OFFICE VISIT (OUTPATIENT)
Dept: PRIMARY CARE CLINIC | Facility: CLINIC | Age: 53
End: 2023-08-29
Payer: MEDICAID

## 2023-08-29 VITALS
OXYGEN SATURATION: 97 % | TEMPERATURE: 96 F | WEIGHT: 285.63 LBS | HEART RATE: 66 BPM | DIASTOLIC BLOOD PRESSURE: 84 MMHG | HEIGHT: 74 IN | BODY MASS INDEX: 36.66 KG/M2 | SYSTOLIC BLOOD PRESSURE: 114 MMHG

## 2023-08-29 DIAGNOSIS — M54.9 BACK PAIN, UNSPECIFIED BACK LOCATION, UNSPECIFIED BACK PAIN LATERALITY, UNSPECIFIED CHRONICITY: Primary | ICD-10-CM

## 2023-08-29 DIAGNOSIS — M54.42 ACUTE BILATERAL LOW BACK PAIN WITH LEFT-SIDED SCIATICA: ICD-10-CM

## 2023-08-29 DIAGNOSIS — M54.6 ACUTE BILATERAL THORACIC BACK PAIN: ICD-10-CM

## 2023-08-29 PROCEDURE — 99999 PR PBB SHADOW E&M-EST. PATIENT-LVL IV: ICD-10-PCS | Mod: PBBFAC,,, | Performed by: NURSE PRACTITIONER

## 2023-08-29 PROCEDURE — 99999 PR PBB SHADOW E&M-EST. PATIENT-LVL IV: CPT | Mod: PBBFAC,,, | Performed by: NURSE PRACTITIONER

## 2023-08-29 PROCEDURE — 99214 OFFICE O/P EST MOD 30 MIN: CPT | Mod: PBBFAC,PN | Performed by: NURSE PRACTITIONER

## 2023-08-29 PROCEDURE — 3079F DIAST BP 80-89 MM HG: CPT | Mod: CPTII,,, | Performed by: NURSE PRACTITIONER

## 2023-08-29 PROCEDURE — 1160F PR REVIEW ALL MEDS BY PRESCRIBER/CLIN PHARMACIST DOCUMENTED: ICD-10-PCS | Mod: CPTII,,, | Performed by: NURSE PRACTITIONER

## 2023-08-29 PROCEDURE — 3074F SYST BP LT 130 MM HG: CPT | Mod: CPTII,,, | Performed by: NURSE PRACTITIONER

## 2023-08-29 PROCEDURE — 3074F PR MOST RECENT SYSTOLIC BLOOD PRESSURE < 130 MM HG: ICD-10-PCS | Mod: CPTII,,, | Performed by: NURSE PRACTITIONER

## 2023-08-29 PROCEDURE — 1159F PR MEDICATION LIST DOCUMENTED IN MEDICAL RECORD: ICD-10-PCS | Mod: CPTII,,, | Performed by: NURSE PRACTITIONER

## 2023-08-29 PROCEDURE — 3008F PR BODY MASS INDEX (BMI) DOCUMENTED: ICD-10-PCS | Mod: CPTII,,, | Performed by: NURSE PRACTITIONER

## 2023-08-29 PROCEDURE — 3079F PR MOST RECENT DIASTOLIC BLOOD PRESSURE 80-89 MM HG: ICD-10-PCS | Mod: CPTII,,, | Performed by: NURSE PRACTITIONER

## 2023-08-29 PROCEDURE — 99213 PR OFFICE/OUTPT VISIT, EST, LEVL III, 20-29 MIN: ICD-10-PCS | Mod: S$PBB,,, | Performed by: NURSE PRACTITIONER

## 2023-08-29 PROCEDURE — 99213 OFFICE O/P EST LOW 20 MIN: CPT | Mod: S$PBB,,, | Performed by: NURSE PRACTITIONER

## 2023-08-29 PROCEDURE — 1159F MED LIST DOCD IN RCRD: CPT | Mod: CPTII,,, | Performed by: NURSE PRACTITIONER

## 2023-08-29 PROCEDURE — 1160F RVW MEDS BY RX/DR IN RCRD: CPT | Mod: CPTII,,, | Performed by: NURSE PRACTITIONER

## 2023-08-29 PROCEDURE — 3008F BODY MASS INDEX DOCD: CPT | Mod: CPTII,,, | Performed by: NURSE PRACTITIONER

## 2023-08-29 RX ORDER — IBUPROFEN 800 MG/1
800 TABLET ORAL EVERY 8 HOURS PRN
Qty: 30 TABLET | Refills: 2 | Status: SHIPPED | OUTPATIENT
Start: 2023-08-29 | End: 2023-09-08

## 2023-08-29 RX ORDER — TIZANIDINE 2 MG/1
2 TABLET ORAL EVERY 6 HOURS PRN
Qty: 30 TABLET | Refills: 2 | Status: SHIPPED | OUTPATIENT
Start: 2023-08-29

## 2023-08-29 NOTE — LETTER
August 29, 2023      [facility]  [address]  [place] [address]       Patient: Franco Pinto   YOB: 1970  Date of Visit: 08/29/2023    To Whom It May Concern:    Db Pinto  was at [facility] on 08/29/2023. The patient may return to work/school on 0/29/2023 with restrictions. Patient is not to lift more than 10 pound and bend forward more than 45 degrees. If you have any questions or concerns, or if I can be of further assistance, please do not hesitate to contact me.    Sincerely,      Chalino Ramirez, DNP

## 2023-09-05 NOTE — PROGRESS NOTES
Subjective:       Patient ID: Franco Pinto is a 53 y.o. male.    Chief Complaint: Follow-up and Back Pain      History of Present Illness:   Franco Pinto 53 y.o. male presents today with reports of low back pain that has been present for over 3 months. Denies any known trauma or injury at this time. Patient denies any other problems or concerns at this time. Treatment options and alternatives were discussed with the patient. Patient provided opportunity to ask additional questions.  All questions were answered. Voices understanding and acceptance of this advice. Instructed to call back if any further questions or concerns.      History reviewed. No pertinent past medical history.  Family History   Problem Relation Age of Onset    Colon cancer Paternal Uncle      Social History     Socioeconomic History    Marital status: Single   Tobacco Use    Smoking status: Never    Smokeless tobacco: Never   Substance and Sexual Activity    Alcohol use: Not Currently     Comment: once a month    Drug use: Never     Social Determinants of Health     Financial Resource Strain: Low Risk  (4/1/2022)    Overall Financial Resource Strain (CARDIA)     Difficulty of Paying Living Expenses: Not hard at all   Food Insecurity: No Food Insecurity (4/1/2022)    Hunger Vital Sign     Worried About Running Out of Food in the Last Year: Never true     Ran Out of Food in the Last Year: Never true   Transportation Needs: No Transportation Needs (4/1/2022)    PRAPARE - Transportation     Lack of Transportation (Medical): No     Lack of Transportation (Non-Medical): No   Physical Activity: Inactive (4/1/2022)    Exercise Vital Sign     Days of Exercise per Week: 0 days     Minutes of Exercise per Session: 0 min   Stress: No Stress Concern Present (4/1/2022)    [employer] [place] [employer] - Occupational Stress Questionnaire     Feeling of Stress : Not at all   Social Connections: Moderately Integrated (4/1/2022)    Social Connection  and Isolation Panel [NHANES]     Frequency of Communication with Friends and Family: Twice a week     Frequency of Social Gatherings with Friends and Family: Once a week     Attends [employer] Services: 1 to 4 times per year     Active Member of Clubs or Organizations: Yes     Attends Club or Organization Meetings: Never     Marital Status: Never    Housing Stability: Unknown (2022)    Housing Stability Vital Sign     Unable to Pay for Housing in the Last Year: No     Unstable Housing in the Last Year: No     Outpatient Encounter Medications as of 2023   Medication Sig Dispense Refill    guaiFENesin (MUCINEX) 600 mg 12 hr tablet Take 1,200 mg by mouth 2 (two) times daily.      [DISCONTINUED] tiZANidine (ZANAFLEX) 2 MG tablet Take 1 tablet (2 mg total) by mouth every 6 (six) hours as needed (back pain). 30 tablet 0    ibuprofen (ADVIL,MOTRIN) 800 MG tablet Take 1 tablet (800 mg total) by mouth every 8 (eight) hours as needed for Pain. 30 tablet 2    [] ofloxacin (FLOXIN) 0.3 % otic solution Place 10 drops into the right ear once daily. for 10 days 10 mL 0    tiZANidine (ZANAFLEX) 2 MG tablet Take 1 tablet (2 mg total) by mouth every 6 (six) hours as needed (back pain). 30 tablet 2    [DISCONTINUED] ibuprofen (ADVIL,MOTRIN) 800 MG tablet Take 1 tablet (800 mg total) by mouth every 8 (eight) hours as needed for Pain. 30 tablet 0     No facility-administered encounter medications on file as of 2023.       Review of Systems   Constitutional:  Negative for appetite change, chills and fever.   HENT:  Negative for ear pain, sinus pressure, sore throat and trouble swallowing.    Eyes:  Negative for visual disturbance.   Respiratory:  Negative for shortness of breath.    Cardiovascular:  Negative for chest pain.   Gastrointestinal:  Negative for abdominal pain, diarrhea, nausea and vomiting.   Endocrine: Negative for cold intolerance, polyphagia and polyuria.   Genitourinary:  Negative for decreased  "urine volume and dysuria.   Musculoskeletal:  Positive for back pain.   Skin:  Negative for rash.   Allergic/Immunologic: Negative for environmental allergies and food allergies.   Neurological:  Negative for dizziness, tremors, weakness and numbness.   Hematological:  Does not bruise/bleed easily.   Psychiatric/Behavioral:  Negative for confusion and hallucinations. The patient is not nervous/anxious and is not hyperactive.    All other systems reviewed and are negative.      Objective:      /84 (BP Location: Left arm, Patient Position: Sitting, BP Method: Large (Manual))   Pulse 66   Temp 96.4 °F (35.8 °C) (Oral)   Ht 6' 2" (1.88 m)   Wt 129.5 kg (285 lb 9.6 oz)   SpO2 97%   BMI 36.67 kg/m²   Physical Exam  Constitutional:       Appearance: He is well-developed. He is obese.   HENT:      Head: Normocephalic.      Nose: Nose normal.   Eyes:      Pupils: Pupils are equal, round, and reactive to light.   Cardiovascular:      Rate and Rhythm: Normal rate.      Heart sounds: Normal heart sounds.   Pulmonary:      Effort: Pulmonary effort is normal.      Breath sounds: Normal breath sounds.   Abdominal:      General: Bowel sounds are normal.      Palpations: Abdomen is soft.   Musculoskeletal:      Cervical back: Normal range of motion.      Lumbar back: Spasms present. Positive right straight leg raise test and positive left straight leg raise test.   Skin:     General: Skin is warm and dry.   Neurological:      Mental Status: He is alert and oriented to person, place, and time.   Psychiatric:         Behavior: Behavior normal.         Results for orders placed or performed during the hospital encounter of 07/07/22   Specimen to Pathology, Surgery Gastrointestinal tract   Result Value Ref Range    Final Pathologic Diagnosis       1. Transverse colon polyp, polypectomy:  - Benign submucosal lymphoid aggregate  -  Negative  for dysplasia or malignancy  2. Descending colon polyp, polypectomy:  - Tubular " adenoma, negative for high-grade dysplasia or carcinoma      Gross       1:  Surgery ID:  [id number];  Pathology ID:  [id number]  1. Received in formalin labeled transverse colon polyp, is 1 fragment, 1 mm  in greatest dimension. Submitted entirely.  IYU--1-A  Grossed by: Ofelia Pfeiffer   2: Surgery ID:  [id number];  Pathology ID:  [id number]  2. Received in formalin labeled descending colon polypectomies x2, are 2  fragments, 1-3 mm in greatest dimension. Submitted entirely.  CZN--2-A  Grossed by: Ofelia Pfeiffer      Disclaimer       Unless the case is a 'gross only' or additional testing only, the final  diagnosis for each specimen is based on a microscopic examination of  appropriate tissue sections.       Assessment:       1. Back pain, unspecified back location, unspecified back pain laterality, unspecified chronicity    2. Acute bilateral thoracic back pain    3. Acute bilateral low back pain with left-sided sciatica        Plan:   Franco was seen today for follow-up and back pain.    Diagnoses and all orders for this visit:    Back pain, unspecified back location, unspecified back pain laterality, unspecified chronicity  -     Ambulatory referral/consult to Physical/Occupational Therapy; Future    Acute bilateral thoracic back pain  -     ibuprofen (ADVIL,MOTRIN) 800 MG tablet; Take 1 tablet (800 mg total) by mouth every 8 (eight) hours as needed for Pain.  -     tiZANidine (ZANAFLEX) 2 MG tablet; Take 1 tablet (2 mg total) by mouth every 6 (six) hours as needed (back pain).    Acute bilateral low back pain with left-sided sciatica  -     ibuprofen (ADVIL,MOTRIN) 800 MG tablet; Take 1 tablet (800 mg total) by mouth every 8 (eight) hours as needed for Pain.  -     tiZANidine (ZANAFLEX) 2 MG tablet; Take 1 tablet (2 mg total) by mouth every 6 (six) hours as needed (back pain).              [facility] [address]  [place] [address]  Office [phone]  Fax  [phone]

## 2023-09-25 ENCOUNTER — CLINICAL SUPPORT (OUTPATIENT)
Dept: REHABILITATION | Facility: HOSPITAL | Age: 53
End: 2023-09-25
Payer: MEDICAID

## 2023-09-25 DIAGNOSIS — G89.29 CHRONIC BILATERAL LOW BACK PAIN WITH BILATERAL SCIATICA: Primary | ICD-10-CM

## 2023-09-25 DIAGNOSIS — M54.41 CHRONIC BILATERAL LOW BACK PAIN WITH BILATERAL SCIATICA: Primary | ICD-10-CM

## 2023-09-25 DIAGNOSIS — M25.60 STIFFNESS IN JOINT: ICD-10-CM

## 2023-09-25 DIAGNOSIS — R29.898 WEAKNESS OF BOTH LOWER EXTREMITIES: ICD-10-CM

## 2023-09-25 DIAGNOSIS — M54.42 CHRONIC BILATERAL LOW BACK PAIN WITH BILATERAL SCIATICA: Primary | ICD-10-CM

## 2023-09-25 DIAGNOSIS — M54.9 BACK PAIN, UNSPECIFIED BACK LOCATION, UNSPECIFIED BACK PAIN LATERALITY, UNSPECIFIED CHRONICITY: ICD-10-CM

## 2023-09-25 PROCEDURE — 97110 THERAPEUTIC EXERCISES: CPT

## 2023-09-25 PROCEDURE — 97161 PT EVAL LOW COMPLEX 20 MIN: CPT
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
## 2023-10-03 ENCOUNTER — CLINICAL SUPPORT (OUTPATIENT)
Dept: REHABILITATION | Facility: HOSPITAL | Age: 53
End: 2023-10-03
Payer: MEDICAID

## 2023-10-03 DIAGNOSIS — M25.60 STIFFNESS IN JOINT: ICD-10-CM

## 2023-10-03 DIAGNOSIS — M54.42 CHRONIC BILATERAL LOW BACK PAIN WITH BILATERAL SCIATICA: Primary | ICD-10-CM

## 2023-10-03 DIAGNOSIS — G89.29 CHRONIC BILATERAL LOW BACK PAIN WITH BILATERAL SCIATICA: Primary | ICD-10-CM

## 2023-10-03 DIAGNOSIS — R29.898 WEAKNESS OF BOTH LOWER EXTREMITIES: ICD-10-CM

## 2023-10-03 DIAGNOSIS — M54.41 CHRONIC BILATERAL LOW BACK PAIN WITH BILATERAL SCIATICA: Primary | ICD-10-CM

## 2023-10-03 PROCEDURE — 97110 THERAPEUTIC EXERCISES: CPT

## 2023-10-03 NOTE — PROGRESS NOTES
[facility]   Physical Therapy Treatment Note        Name: Franco Pinto  Clinic Number: [id number]    Therapy Diagnosis:   Encounter Diagnoses   Name Primary?    Chronic bilateral low back pain with bilateral sciatica Yes    Weakness of both lower extremities     Stiffness in joint      Physician: Chalino Ramirez*    Visit Date: 10/3/2023    Physician Orders: PT Eval and Treat  Medical Diagnosis from Referral: Back pain, unspecified back location, unspecified back pain laterality, unspecified chronicity  Evaluation Date: 9/25/2023  Authorization Period Expiration: 08/28/2024  Plan of Care Expiration: 11/20/2023  Progress Note Due: 10/25/2023  Visit # / Visits authorized: 1/20 (+1 evaluation)  FOTO: 1/3 (last performed on 10/03/2023)     Precautions: Standard  PTA Visit #: 0/5     Time In: 1653  Time Out: 1750  Total Billable Time: 57 minutes (Billing reflects 1 on 1 treatment time spent with patient)    Subjective     Patient reports: he has been having shocking pain down lateral left thigh over the last week. Patient reports he has been going to his chiropractor every Monday and gets some relief following for about a day but symptoms quickly returns. Patient states he has been too busy to keep up with home exercise plan.     He/She was partially compliant with home exercise program.  Response to previous treatment: no change.   Functional change: no change    Pain: -/10     Location: Lumbar    Objective      Objective Measures updated at progress report or POC update only unless otherwise noted.     Function:      Intake Outcome Measure for FOTO Lumbar Survey     Therapist reviewed FOTO scores for Franco on 10/03/2023.   FOTO report - see Media section or FOTO account for episode details     Intake Score: 34%         Treatment     Franco received the treatments listed below:     MANUAL THERAPY TECHNIQUES were applied for (0) minutes, including:     Manual Intervention Performed  "Today     Soft Tissue Mobilization []  bilateral  paraspinals, glutes, piriformis   Joint Mobilizations []        []        []      Functional Dry Needling  []         Plan for Next Visit: Continue as needed       THERAPEUTIC EXERCISES to develop strength, endurance, ROM, flexibility, posture, and core stabilization for (55) minutes including:     Intervention Performed Today     Bike x 6 minutes L1   Openbooks  x   x15 bilateral    90/90 Nerve Glide  x  x15 bilateral    Posterior Pelvic Tilt   x  10x10 second holds   Posterior Pelvic Tilt with March x x15   Posterior Pelvic Tilt with knee fallout x x15    Side Lying Hip abduction  x  x30 bilateral    Bridges  x X30 bilateral     Hamstring Stretch  x 3x30" bilateral         FOTO, Education x x15              Plan for Next Visit: FOTO, bike, bridges, Posterior Pelvic Tilt progressions, HS stretch, piriformis stretch shuttle squats, side stepping       Patient Education and Home Exercises       Home Exercises Provided and Patient Education Provided     Education provided: time included in treatment time.   PURPOSE: Patient educated on the impairments noted above and the effects of physical therapy intervention to improve overall condition and QOL.   EXERCISE: Patient was educated on all the above exercise prior/during/after for proper posture, positioning, and execution for safe performance with home exercise program.   STRENGTH: Patient educated on the importance of improved core and extremity strength in order to improve alignment of the spine and extremities with static positions and dynamic movement.   POSTURE: Patient educated on postural awareness to reduce stress and maintain optimal alignment of the spine with static positions and dynamic movement   SLEEPING POSITIONS: Patient educated on the use of pillows to aid in neutral alignment of spine and extremities when sleeping in supine or side lying.  TRANSFERS & TRANSITIONS: Patient educated on proper technique " for bed mobility, transitions and transfers to improve body mechanics and decrease risk of injury.     Written Home Exercises Provided: yes.  Exercises were reviewed and Franco was able to demonstrate them prior to the end of the session.  Franco demonstrated good  understanding of the education provided. See EMR under Patient Instructions for exercises provided during therapy sessions.    Assessment     Patient tolerated today's session well. Patient able to complete home exercise plan well with moderate cueing for form required as patient has not been completing independently. Patient progressed Posterior Pelvic Tilt to moving lower extremity while maintaining Posterior Pelvic Tilt and breathing. Patient also progressed with hip strengthening including bridges and side lying hip abductions.     Franco is progressing well towards his goals.   Patient prognosis is Good.     Patient will continue to benefit from skilled outpatient physical therapy to address the deficits listed in the problem list box on initial evaluation, provide pt/family education and to maximize patient's level of independence in the home and community environment.     Patient's spiritual, cultural and educational needs considered and pt agreeable to plan of care and goals.     Anticipated Barriers for therapy: sedentary lifestyle and chronicity of condition    Short Term Goals:  4 weeks Status  Date Met   PAIN: Pt will report worst pain of 7/10 in order to progress toward max functional ability and improve quality of life. [x] Progressing  [] Met  [] Not Met     FUNCTION: Patient will demonstrate improved function as indicated by a score of greater than or equal to 50% of goal score out of 100 on FOTO. [x] Progressing  [] Met  [] Not Met     MOBILITY: Patient will improve AROM to 50% of stated goals, listed in objective measures above, in order to progress towards independence with functional activities.  [x] Progressing  [] Met  [] Not Met      STRENGTH: Patient will improve strength to 50% of stated goals, listed in objective measures above, in order to progress towards independence with functional activities. [x] Progressing  [] Met  [] Not Met     POSTURE: Patient will correct postural deviations in sitting and standing, to decrease pain and promote long term stability.  [x] Progressing  [] Met  [] Not Met     GAIT: Patient will demonstrate improved gait mechanics including improved gait in order to improve functional mobility, improve quality of life, and decrease risk of further injury or fall.  [x] Progressing  [] Met  [] Not Met     HEP: Patient will demonstrate independence with HEP in order to progress toward functional independence. [x] Progressing  [] Met  [] Not Met        Long Term Goals:  8 weeks Status Date Met   PAIN: Pt will report worst pain of 3/10 in order to progress toward max functional ability and improve quality of life [x] Progressing  [] Met  [] Not Met     FUNCTION: Patient will demonstrate improved function as indicated by a score of greater than or equal to goal score out of 100 on FOTO. [x] Progressing  [] Met  [] Not Met     MOBILITY: Patient will improve AROM to stated goals, listed in objective measures above, in order to return to maximal functional potential and improve quality of life.  [x] Progressing  [] Met  [] Not Met     STRENGTH: Patient will improve strength to stated goals, listed in objective measures above, in order to improve functional independence and quality of life.  [x] Progressing  [] Met  [] Not Met     GAIT: Patient will demonstrate normalized gait mechanics with minimal compensation in order to return to PLOF. [x] Progressing  [] Met  [] Not Met     Patient will return to normal ADL's, IADL's, community involvement, recreational activities, and work-related activities with less than or equal to 3/10 pain and maximal function.  [x] Progressing  [] Met  [] Not Met          Plan     Continue Plan of  Care (POC) and progress per patient tolerance. See treatment section for details on planned progressions next session.      Eulalio Davidson, PT

## 2023-10-04 ENCOUNTER — TELEPHONE (OUTPATIENT)
Dept: PRIMARY CARE CLINIC | Facility: CLINIC | Age: 53
End: 2023-10-04
Payer: MEDICAID

## 2023-10-04 NOTE — TELEPHONE ENCOUNTER
Returned the patient call the patient states that his issues has been taken care of and has no further concerns.          ----- Message from Corinna Houston sent at 10/4/2023  9:20 AM CDT -----  Contact: self [phone]  Pt requesting a call in regards to paperwork for work.    Please call and advise

## 2024-02-08 ENCOUNTER — DOCUMENTATION ONLY (OUTPATIENT)
Dept: REHABILITATION | Facility: HOSPITAL | Age: 54
End: 2024-02-08
Payer: MEDICAID

## 2024-02-08 DIAGNOSIS — M54.41 CHRONIC BILATERAL LOW BACK PAIN WITH BILATERAL SCIATICA: Primary | ICD-10-CM

## 2024-02-08 DIAGNOSIS — M25.60 STIFFNESS IN JOINT: ICD-10-CM

## 2024-02-08 DIAGNOSIS — M54.42 CHRONIC BILATERAL LOW BACK PAIN WITH BILATERAL SCIATICA: Primary | ICD-10-CM

## 2024-02-08 DIAGNOSIS — R29.898 WEAKNESS OF BOTH LOWER EXTREMITIES: ICD-10-CM

## 2024-02-08 DIAGNOSIS — G89.29 CHRONIC BILATERAL LOW BACK PAIN WITH BILATERAL SCIATICA: Primary | ICD-10-CM

## 2024-02-08 NOTE — PROGRESS NOTES
[facility]  Physical Therapy Discharge Note    Name: Franco Pinto  [facility] Number: [id number]    Therapy Diagnosis:   Encounter Diagnoses   Name Primary?    Chronic bilateral low back pain with bilateral sciatica Yes    Weakness of both lower extremities     Stiffness in joint      Physician: Chalino Ramirez*    Physician Orders: PT Eval and Treat  Medical Diagnosis from Referral: Back pain, unspecified back location, unspecified back pain laterality, unspecified chronicity  Evaluation Date: 9/25/2023      Date of Last visit: 10/03/2023  Total Visits Received: 1    ASSESSMENT      See daily note    Discharge reason: Patient has not attended therapy since 10/03/2023    Discharge FOTO Score: see daily note    Goals: see daily note    PLAN   This patient is discharged from Physical Therapy      Eulalio Davidson, PT

## 2024-04-02 ENCOUNTER — TELEPHONE (OUTPATIENT)
Dept: PRIMARY CARE CLINIC | Facility: CLINIC | Age: 54
End: 2024-04-02
Payer: MEDICAID

## 2024-04-02 NOTE — TELEPHONE ENCOUNTER
Returned the call to inform him to inform that we are not sure which provider  from [facility] he was preferred to, patient verbalized his understanding      ----- Message from Radha Samuel sent at 4/2/2024 10:55 AM CDT -----  Contact: [phone]  Patient called, stated that he was refer to an ear specialist not to long ago, but does not remember who it was, would like to know if JESSICA Orellana can provide him with that information. Thank you.

## 2024-08-05 ENCOUNTER — TELEPHONE (OUTPATIENT)
Dept: PRIMARY CARE CLINIC | Facility: CLINIC | Age: 54
End: 2024-08-05
Payer: MEDICAID

## 2025-04-15 ENCOUNTER — OFFICE VISIT (OUTPATIENT)
Dept: PRIMARY CARE CLINIC | Facility: CLINIC | Age: 55
End: 2025-04-15
Payer: MEDICAID

## 2025-04-15 VITALS
TEMPERATURE: 99 F | OXYGEN SATURATION: 99 % | HEART RATE: 69 BPM | WEIGHT: 287 LBS | HEIGHT: 74 IN | SYSTOLIC BLOOD PRESSURE: 124 MMHG | DIASTOLIC BLOOD PRESSURE: 76 MMHG | BODY MASS INDEX: 36.83 KG/M2

## 2025-04-15 DIAGNOSIS — Z00.00 GENERAL MEDICAL EXAM: Primary | ICD-10-CM

## 2025-04-15 DIAGNOSIS — Z13.1 SCREENING FOR DIABETES MELLITUS: ICD-10-CM

## 2025-04-15 DIAGNOSIS — Z13.220 ENCOUNTER FOR LIPID SCREENING FOR CARDIOVASCULAR DISEASE: ICD-10-CM

## 2025-04-15 DIAGNOSIS — R20.2 PARESTHESIAS IN RIGHT HAND: ICD-10-CM

## 2025-04-15 DIAGNOSIS — Z13.6 ENCOUNTER FOR LIPID SCREENING FOR CARDIOVASCULAR DISEASE: ICD-10-CM

## 2025-04-15 PROCEDURE — 3008F BODY MASS INDEX DOCD: CPT | Mod: CPTII,,, | Performed by: NURSE PRACTITIONER

## 2025-04-15 PROCEDURE — 1159F MED LIST DOCD IN RCRD: CPT | Mod: CPTII,,, | Performed by: NURSE PRACTITIONER

## 2025-04-15 PROCEDURE — 3074F SYST BP LT 130 MM HG: CPT | Mod: CPTII,,, | Performed by: NURSE PRACTITIONER

## 2025-04-15 PROCEDURE — 99214 OFFICE O/P EST MOD 30 MIN: CPT | Mod: S$PBB,,, | Performed by: NURSE PRACTITIONER

## 2025-04-15 PROCEDURE — 1160F RVW MEDS BY RX/DR IN RCRD: CPT | Mod: CPTII,,, | Performed by: NURSE PRACTITIONER

## 2025-04-15 PROCEDURE — 3078F DIAST BP <80 MM HG: CPT | Mod: CPTII,,, | Performed by: NURSE PRACTITIONER

## 2025-04-15 PROCEDURE — 99213 OFFICE O/P EST LOW 20 MIN: CPT | Mod: PBBFAC,PN | Performed by: NURSE PRACTITIONER

## 2025-04-15 PROCEDURE — 99999 PR PBB SHADOW E&M-EST. PATIENT-LVL III: CPT | Mod: PBBFAC,,, | Performed by: NURSE PRACTITIONER

## 2025-04-21 NOTE — PROGRESS NOTES
Subjective:       Patient ID: Franco Pinto is a 54 y.o. male.    Chief Complaint: Follow-up (Follow up from the urgent care visit. ) and Numbness (Throat is still numb since going to urgent care. )      History of Present Illness:   Franco Pinto 54 y.o. male presents today with reports of numbness to bilateral hands. Patient denies any other problems or concerns at this time. Patient was recently dx with strep throat and still reports that her throat is numb. Denies any other problems or concerns at this time. Treatment options and alternatives were discussed with the patient. Patient provided opportunity to ask additional questions.  All questions were answered. Voices understanding and acceptance of this advice. Instructed to call back if any further questions or concerns.               History reviewed. No pertinent past medical history.  Family History   Problem Relation Name Age of Onset    Colon cancer Paternal Uncle       Social History[1]  Encounter Medications[2]    Review of Systems   Constitutional:  Negative for appetite change, chills and fever.   HENT:  Negative for ear pain, sinus pressure, sore throat and trouble swallowing.    Eyes:  Negative for visual disturbance.   Respiratory:  Negative for shortness of breath.    Cardiovascular:  Negative for chest pain.   Gastrointestinal:  Negative for abdominal pain, diarrhea, nausea and vomiting.   Endocrine: Negative for cold intolerance, polyphagia and polyuria.   Genitourinary:  Negative for decreased urine volume and dysuria.   Musculoskeletal:  Negative for back pain.   Skin:  Negative for rash.   Allergic/Immunologic: Negative for environmental allergies and food allergies.   Neurological:  Negative for dizziness, tremors, weakness and numbness.   Hematological:  Does not bruise/bleed easily.   Psychiatric/Behavioral:  Negative for confusion and hallucinations. The patient is not nervous/anxious and is not hyperactive.    All other systems reviewed  "and are negative.      Objective:   Physical Exam  Vitals and nursing note reviewed.   Constitutional:       General: He is not in acute distress.     Appearance: Normal appearance. He is normal weight. He is not ill-appearing or toxic-appearing.   Cardiovascular:      Rate and Rhythm: Normal rate and regular rhythm.      Pulses: Normal pulses.      Heart sounds: Normal heart sounds.   Pulmonary:      Effort: Pulmonary effort is normal.      Breath sounds: Normal breath sounds.   Neurological:      Mental Status: He is alert.           /76 (BP Location: Right arm, Patient Position: Sitting)   Pulse 69   Temp 98.7 °F (37.1 °C) (Oral)   Ht 6' 2" (1.88 m)   Wt 130.2 kg (287 lb)   SpO2 99%   BMI 36.85 kg/m²   Physical Exam               Results for orders placed or performed during the hospital encounter of 07/07/22   Specimen to Pathology, Surgery Gastrointestinal tract    Collection Time: 07/07/22  8:17 AM   Result Value Ref Range    Final Pathologic Diagnosis       1. Transverse colon polyp, polypectomy:  - Benign submucosal lymphoid aggregate  -  Negative  for dysplasia or malignancy  2. Descending colon polyp, polypectomy:  - Tubular adenoma, negative for high-grade dysplasia or carcinoma      Gross       1:  Surgery ID:  [id number];  Pathology ID:  [id number]  1. Received in formalin labeled transverse colon polyp, is 1 fragment, 1 mm  in greatest dimension. Submitted entirely.  JDC--1-A  Grossed by: Ofelia Pfeiffer   2: Surgery ID:  [id number];  Pathology ID:  [id number]  2. Received in formalin labeled descending colon polypectomies x2, are 2  fragments, 1-3 mm in greatest dimension. Submitted entirely.  BFL--2-A  Grossed by: Ofelia Pfeiffer      Disclaimer       Unless the case is a 'gross only' or additional testing only, the final  diagnosis for each specimen is based on a microscopic examination of  appropriate tissue sections.       Assessment:       1. General medical exam    2. Screening for " diabetes mellitus    3. Encounter for lipid screening for cardiovascular disease    4. Paresthesias in right hand    Assessment & Plan             Plan:   General medical exam    Screening for diabetes mellitus    Encounter for lipid screening for cardiovascular disease    Paresthesias in right hand  Comments:  RTC in 2 weeks for followp  Will consider MRI       Today's encounter took a total time of 20 minutes, and that time included Preparing to see the patient (review records, tests), Obtaining and/or reviewing separately obtained historical data, Performing a medically appropriate examination and/or evaluation , Counseling & educating the patient/family/caregiver on treatment plan with chronic health conditions , ordering medications, tests, and/or procedures, Referring and communicating with other healthcare professionals , Documenting clinical information in the electronic or other health record, Independently interpreting results & communicating results to the patient/family/caregiver.           [facility] [address] [place] [id number]  Office [phone]  Fax [phone]   This note was generated with the assistance of ambient listening technology. Verbal consent was obtained by the patient and accompanying visitor(s) for the recording of patient appointment to facilitate this note. I attest to having reviewed and edited the generated note for accuracy, though some syntax or spelling errors may persist. Please contact the author of this note for any clarification.                  [1]   Social History  Socioeconomic History    Marital status: Single   Tobacco Use    Smoking status: Never    Smokeless tobacco: Never   Substance and Sexual Activity    Alcohol use: Not Currently     Comment: once a month    Drug use: Never     Social Drivers of Health     Financial Resource Strain: Low Risk  (4/1/2022)    Overall Financial Resource Strain (CARDIA)      Difficulty of Paying Living Expenses: Not hard at all   Food Insecurity: No Food Insecurity (4/1/2022)    Hunger Vital Sign     Worried About Running Out of Food in the Last Year: Never true     Ran Out of Food in the Last Year: Never true   Transportation Needs: No Transportation Needs (4/1/2022)    PRAPARE - Transportation     Lack of Transportation (Medical): No     Lack of Transportation (Non-Medical): No   Physical Activity: Inactive (4/1/2022)    Exercise Vital Sign     Days of Exercise per Week: 0 days     Minutes of Exercise per Session: 0 min   Stress: No Stress Concern Present (4/1/2022)    Citizen of [place] of Occupational Health - Occupational Stress Questionnaire     Feeling of Stress : Not at all   Housing Stability: Unknown (4/1/2022)    Housing Stability Vital Sign     Unable to Pay for Housing in the Last Year: No     Unstable Housing in the Last Year: No   [2]   Outpatient Encounter Medications as of 4/15/2025   Medication Sig Dispense Refill    guaiFENesin (MUCINEX) 600 mg 12 hr tablet Take 1,200 mg by mouth 2 (two) times daily. (Patient not taking: Reported on 4/15/2025)      tiZANidine (ZANAFLEX) 2 MG tablet Take 1 tablet (2 mg total) by mouth every 6 (six) hours as needed (back pain). (Patient not taking: Reported on 4/15/2025) 30 tablet 2     No facility-administered encounter medications on file as of 4/15/2025.

## 2025-06-25 ENCOUNTER — OFFICE VISIT (OUTPATIENT)
Dept: PRIMARY CARE CLINIC | Facility: CLINIC | Age: 55
End: 2025-06-25
Payer: MEDICAID

## 2025-06-25 ENCOUNTER — PATIENT OUTREACH (OUTPATIENT)
Dept: ADMINISTRATIVE | Facility: OTHER | Age: 55
End: 2025-06-25
Payer: MEDICAID

## 2025-06-25 ENCOUNTER — LAB VISIT (OUTPATIENT)
Dept: LAB | Facility: HOSPITAL | Age: 55
End: 2025-06-25
Attending: NURSE PRACTITIONER
Payer: MEDICAID

## 2025-06-25 VITALS
DIASTOLIC BLOOD PRESSURE: 80 MMHG | TEMPERATURE: 99 F | BODY MASS INDEX: 38.13 KG/M2 | HEIGHT: 74 IN | SYSTOLIC BLOOD PRESSURE: 130 MMHG | HEART RATE: 64 BPM | OXYGEN SATURATION: 96 % | WEIGHT: 297.13 LBS

## 2025-06-25 DIAGNOSIS — R07.89 CHEST DISCOMFORT: ICD-10-CM

## 2025-06-25 DIAGNOSIS — Z13.6 ENCOUNTER FOR LIPID SCREENING FOR CARDIOVASCULAR DISEASE: ICD-10-CM

## 2025-06-25 DIAGNOSIS — Z11.3 SCREENING EXAMINATION FOR VENEREAL DISEASE: ICD-10-CM

## 2025-06-25 DIAGNOSIS — Z00.00 GENERAL MEDICAL EXAM: ICD-10-CM

## 2025-06-25 DIAGNOSIS — Z11.4 SCREENING FOR HIV (HUMAN IMMUNODEFICIENCY VIRUS): ICD-10-CM

## 2025-06-25 DIAGNOSIS — Z11.59 NEED FOR HEPATITIS C SCREENING TEST: ICD-10-CM

## 2025-06-25 DIAGNOSIS — Z02.71 ENCOUNTER FOR DISABILITY ASSESSMENT: ICD-10-CM

## 2025-06-25 DIAGNOSIS — L65.9 HAIR LOSS: ICD-10-CM

## 2025-06-25 DIAGNOSIS — Z00.00 GENERAL MEDICAL EXAM: Primary | ICD-10-CM

## 2025-06-25 DIAGNOSIS — Z13.1 SCREENING FOR DIABETES MELLITUS: ICD-10-CM

## 2025-06-25 DIAGNOSIS — Z13.220 ENCOUNTER FOR LIPID SCREENING FOR CARDIOVASCULAR DISEASE: ICD-10-CM

## 2025-06-25 LAB
ABSOLUTE EOSINOPHIL (OHS): 0.14 K/UL
ABSOLUTE MONOCYTE (OHS): 0.46 K/UL (ref 0.3–1)
ABSOLUTE NEUTROPHIL COUNT (OHS): 2.85 K/UL (ref 1.8–7.7)
ALBUMIN SERPL BCP-MCNC: 4 G/DL (ref 3.5–5.2)
ALP SERPL-CCNC: 93 UNIT/L (ref 40–150)
ALT SERPL W/O P-5'-P-CCNC: 41 UNIT/L (ref 10–44)
ANION GAP (OHS): 9 MMOL/L (ref 8–16)
AST SERPL-CCNC: 26 UNIT/L (ref 11–45)
BASOPHILS # BLD AUTO: 0.03 K/UL
BASOPHILS NFR BLD AUTO: 0.5 %
BILIRUB SERPL-MCNC: 0.4 MG/DL (ref 0.1–1)
BUN SERPL-MCNC: 21 MG/DL (ref 6–20)
CALCIUM SERPL-MCNC: 8.9 MG/DL (ref 8.7–10.5)
CHLORIDE SERPL-SCNC: 106 MMOL/L (ref 95–110)
CHOLEST SERPL-MCNC: 198 MG/DL (ref 120–199)
CHOLEST/HDLC SERPL: 5.7 {RATIO} (ref 2–5)
CO2 SERPL-SCNC: 24 MMOL/L (ref 23–29)
CREAT SERPL-MCNC: 1.2 MG/DL (ref 0.5–1.4)
EAG (OHS): 103 MG/DL (ref 68–131)
ERYTHROCYTE [DISTWIDTH] IN BLOOD BY AUTOMATED COUNT: 12.7 % (ref 11.5–14.5)
GFR SERPLBLD CREATININE-BSD FMLA CKD-EPI: >60 ML/MIN/1.73/M2
GLUCOSE SERPL-MCNC: 86 MG/DL (ref 70–110)
HBA1C MFR BLD: 5.2 % (ref 4–5.6)
HCT VFR BLD AUTO: 48 % (ref 40–54)
HCV AB SERPL QL IA: NORMAL
HDLC SERPL-MCNC: 35 MG/DL (ref 40–75)
HDLC SERPL: 17.7 % (ref 20–50)
HGB BLD-MCNC: 15.8 GM/DL (ref 14–18)
HIV 1+2 AB+HIV1 P24 AG SERPL QL IA: NORMAL
IMM GRANULOCYTES # BLD AUTO: 0.01 K/UL (ref 0–0.04)
IMM GRANULOCYTES NFR BLD AUTO: 0.2 % (ref 0–0.5)
LDLC SERPL CALC-MCNC: 138.4 MG/DL (ref 63–159)
LYMPHOCYTES # BLD AUTO: 2.13 K/UL (ref 1–4.8)
MCH RBC QN AUTO: 30.2 PG (ref 27–31)
MCHC RBC AUTO-ENTMCNC: 32.9 G/DL (ref 32–36)
MCV RBC AUTO: 92 FL (ref 82–98)
NONHDLC SERPL-MCNC: 163 MG/DL
NUCLEATED RBC (/100WBC) (OHS): 0 /100 WBC
PLATELET # BLD AUTO: 220 K/UL (ref 150–450)
PMV BLD AUTO: 9.8 FL (ref 9.2–12.9)
POTASSIUM SERPL-SCNC: 3.8 MMOL/L (ref 3.5–5.1)
PROT SERPL-MCNC: 7.4 GM/DL (ref 6–8.4)
RBC # BLD AUTO: 5.23 M/UL (ref 4.6–6.2)
RELATIVE EOSINOPHIL (OHS): 2.5 %
RELATIVE LYMPHOCYTE (OHS): 37.9 % (ref 18–48)
RELATIVE MONOCYTE (OHS): 8.2 % (ref 4–15)
RELATIVE NEUTROPHIL (OHS): 50.7 % (ref 38–73)
SODIUM SERPL-SCNC: 139 MMOL/L (ref 136–145)
TRIGL SERPL-MCNC: 123 MG/DL (ref 30–150)
TSH SERPL-ACNC: 2.14 UIU/ML (ref 0.4–4)
WBC # BLD AUTO: 5.62 K/UL (ref 3.9–12.7)

## 2025-06-25 PROCEDURE — 93010 ELECTROCARDIOGRAM REPORT: CPT | Mod: S$PBB,,, | Performed by: INTERNAL MEDICINE

## 2025-06-25 PROCEDURE — 99215 OFFICE O/P EST HI 40 MIN: CPT | Mod: PBBFAC,PN | Performed by: NURSE PRACTITIONER

## 2025-06-25 PROCEDURE — 3079F DIAST BP 80-89 MM HG: CPT | Mod: CPTII,,, | Performed by: NURSE PRACTITIONER

## 2025-06-25 PROCEDURE — 1159F MED LIST DOCD IN RCRD: CPT | Mod: CPTII,,, | Performed by: NURSE PRACTITIONER

## 2025-06-25 PROCEDURE — 99214 OFFICE O/P EST MOD 30 MIN: CPT | Mod: S$PBB,,, | Performed by: NURSE PRACTITIONER

## 2025-06-25 PROCEDURE — 83036 HEMOGLOBIN GLYCOSYLATED A1C: CPT

## 2025-06-25 PROCEDURE — 80053 COMPREHEN METABOLIC PANEL: CPT

## 2025-06-25 PROCEDURE — 80061 LIPID PANEL: CPT

## 2025-06-25 PROCEDURE — 3008F BODY MASS INDEX DOCD: CPT | Mod: CPTII,,, | Performed by: NURSE PRACTITIONER

## 2025-06-25 PROCEDURE — 93005 ELECTROCARDIOGRAM TRACING: CPT | Mod: PBBFAC,PN | Performed by: INTERNAL MEDICINE

## 2025-06-25 PROCEDURE — 86803 HEPATITIS C AB TEST: CPT

## 2025-06-25 PROCEDURE — 1160F RVW MEDS BY RX/DR IN RCRD: CPT | Mod: CPTII,,, | Performed by: NURSE PRACTITIONER

## 2025-06-25 PROCEDURE — 99999 PR PBB SHADOW E&M-EST. PATIENT-LVL V: CPT | Mod: PBBFAC,,, | Performed by: NURSE PRACTITIONER

## 2025-06-25 PROCEDURE — 3075F SYST BP GE 130 - 139MM HG: CPT | Mod: CPTII,,, | Performed by: NURSE PRACTITIONER

## 2025-06-25 PROCEDURE — 87389 HIV-1 AG W/HIV-1&-2 AB AG IA: CPT

## 2025-06-25 PROCEDURE — 36415 COLL VENOUS BLD VENIPUNCTURE: CPT | Mod: PN

## 2025-06-25 PROCEDURE — 84443 ASSAY THYROID STIM HORMONE: CPT

## 2025-06-25 PROCEDURE — 85025 COMPLETE CBC W/AUTO DIFF WBC: CPT

## 2025-06-25 RX ORDER — IBUPROFEN 800 MG/1
800 TABLET, FILM COATED ORAL EVERY 6 HOURS PRN
COMMUNITY
Start: 2024-09-04

## 2025-06-25 RX ORDER — MINOXIDIL 2 %
SOLUTION, NON-ORAL TOPICAL 2 TIMES DAILY
Qty: 60 ML | Refills: 0 | Status: SHIPPED | OUTPATIENT
Start: 2025-06-25 | End: 2025-09-23

## 2025-06-25 NOTE — PROGRESS NOTES
CHW - Initial Contact    This Community Health Worker completed the Social Determinant of Health questionnaire with patient during clinic visit today.    Pt identified barriers of most importance are none at this time however patient haven't been working for two years.   Referrals to community agencies completed with patient consent outside of [facility] include. No outside referrals at this time.  Referrals were put through [facility] - no  Support and Services: None  Other information discussed the patient needs help with. No other information was discussed.   Follow up required: No  No future outreach task assigned

## 2025-06-25 NOTE — PROGRESS NOTES
"Subjective:       Patient ID: Franco Pinto is a 54 y.o. male.    Chief Complaint: Annual Exam (Pt requesting forms to get filled for disability )      History of Present Illness:   Franco Pinto 54 y.o. male presents today with request for mental capacity paper work and physical disability paper work. He is also complaining of chest pain that he states occurred a week ago. He feels a stinging nagging discomfort that radiates form chest and up his shoulder for a few minutes and then it stops. Denies headaches or shortness of breath. He is also due for annual labs. He states that he has a history of chronic back pain due to an incident that occurred in 2023. He has brought results of his last mri of 2023. He states that the pain is constant and wants a referral to orthopedics. He has no other concerns at this time.   HPI     Annual Exam     Additional comments: Pt requesting forms to get filled for disability           Last edited by Rabia Ysuuf CMA on 6/25/2025  9:01 AM.      History reviewed. No pertinent past medical history.  Family History   Problem Relation Name Age of Onset    Colon cancer Paternal Uncle       Social History[1]  Encounter Medications[2]    Review of Systems   Constitutional:  Negative for fatigue.   HENT:  Negative for sinus pressure.    Respiratory:  Negative for chest tightness.    Cardiovascular:  Positive for chest pain. Negative for leg swelling.   Genitourinary:  Negative for dysuria.   Musculoskeletal:  Positive for back pain. Negative for arthralgias and joint swelling.   Neurological:  Positive for numbness. Negative for syncope, weakness and headaches.   Psychiatric/Behavioral:  Negative for agitation.      Objective:      /80 (BP Location: Left arm, Patient Position: Sitting)   Pulse 64   Temp 98.5 °F (36.9 °C) (Oral)   Ht 6' 2" (1.88 m)   Wt 134.8 kg (297 lb 1.6 oz)   SpO2 96%   BMI 38.15 kg/m²   Physical Exam  Constitutional:       Appearance: Normal appearance. " He is normal weight.   HENT:      Head: Normocephalic.      Mouth/Throat:      Mouth: Mucous membranes are moist.   Eyes:      Conjunctiva/sclera: Conjunctivae normal.   Cardiovascular:      Rate and Rhythm: Normal rate and regular rhythm.   Pulmonary:      Effort: Pulmonary effort is normal.      Breath sounds: Normal breath sounds.   Abdominal:      General: Abdomen is flat. Bowel sounds are normal.      Palpations: Abdomen is soft.   Musculoskeletal:         General: Normal range of motion.      Cervical back: Normal range of motion.   Skin:     General: Skin is warm and dry.   Neurological:      General: No focal deficit present.      Mental Status: He is alert and oriented to person, place, and time. Mental status is at baseline.   Psychiatric:         Mood and Affect: Mood normal.         Behavior: Behavior normal.       Results for orders placed or performed during the hospital encounter of 07/07/22   Specimen to Pathology, Surgery Gastrointestinal tract    Collection Time: 07/07/22  8:17 AM   Result Value Ref Range    Final Pathologic Diagnosis       1. Transverse colon polyp, polypectomy:  - Benign submucosal lymphoid aggregate  -  Negative  for dysplasia or malignancy  2. Descending colon polyp, polypectomy:  - Tubular adenoma, negative for high-grade dysplasia or carcinoma      Gross       1:  Surgery ID:  [id number];  Pathology ID:  [id number]  1. Received in formalin labeled transverse colon polyp, is 1 fragment, 1 mm  in greatest dimension. Submitted entirely.  GLP--1-A  Grossed by: Ofelia Pfeiffer   2: Surgery ID:  [id number];  Pathology ID:  [id number]  2. Received in formalin labeled descending colon polypectomies x2, are 2  fragments, 1-3 mm in greatest dimension. Submitted entirely.  SFQ--2-A  Grossed by: Ofelia Pfeiffer      Disclaimer       Unless the case is a 'gross only' or additional testing only, the final  diagnosis for each specimen is based on a microscopic examination of  appropriate  tissue sections.       Assessment:       1. General medical exam    2. Encounter for lipid screening for cardiovascular disease    3. Screening for diabetes mellitus    4. Need for hepatitis C screening test    5. Screening for HIV (human immunodeficiency virus)    6. Screening examination for venereal disease    7. Chest discomfort    8. Encounter for disability assessment    9. Hair loss        Plan:   General medical exam  -     TSH; Future; Expected date: 06/25/2025  -     Comprehensive Metabolic Panel; Future; Expected date: 06/25/2025  -     CBC Auto Differential; Future; Expected date: 06/25/2025    Encounter for lipid screening for cardiovascular disease  -     Lipid Panel; Future; Expected date: 06/25/2025    Screening for diabetes mellitus  -     Hemoglobin A1C; Future; Expected date: 06/25/2025    Need for hepatitis C screening test  -     Hepatitis C Antibody; Future; Expected date: 06/25/2025    Screening for HIV (human immunodeficiency virus)  -     HIV 1/2 Ag/Ab (4th Gen); Future; Expected date: 06/25/2025    Screening examination for venereal disease    Chest discomfort  -     IN OFFICE EKG 12-LEAD (to [facility])    Encounter for disability assessment  -     Ambulatory referral/consult to Neurology; Future; Expected date: 07/02/2025  -     Ambulatory Referral/Consult to Physical Therapy    Hair loss  -     minoxidiL (ROGAINE) 2 % external solution; Apply topically 2 (two) times daily.  Dispense: 60 mL; Refill: 0             [facility]   [address]  [place] [address]  Office [phone]  Fax [phone]          [1]  Social History  Socioeconomic History    Marital status: Single   Tobacco Use    Smoking status: Never    Smokeless tobacco: Never   Substance and Sexual Activity    Alcohol use: Not Currently     Comment: once a month    Drug use: Never     Social Drivers of Health     Financial Resource Strain: High Risk (6/25/2025)    Overall  Financial Resource Strain (CARDIA)     Difficulty of Paying Living Expenses: Hard   Food Insecurity: No Food Insecurity (6/25/2025)    Hunger Vital Sign     Worried About Running Out of Food in the Last Year: Never true     Ran Out of Food in the Last Year: Never true   Transportation Needs: No Transportation Needs (6/25/2025)    PRAPARE - Transportation     Lack of Transportation (Medical): No     Lack of Transportation (Non-Medical): No   Physical Activity: Inactive (6/25/2025)    Exercise Vital Sign     Days of Exercise per Week: 0 days     Minutes of Exercise per Session: 0 min   Stress: No Stress Concern Present (6/25/2025)    [place] of Occupational Health - Occupational Stress Questionnaire     Feeling of Stress : Only a little   Housing Stability: Low Risk  (6/25/2025)    Housing Stability Vital Sign     Unable to Pay for Housing in the Last Year: No     Number of Times Moved in the Last Year: 0     Homeless in the Last Year: No   [2]  Outpatient Encounter Medications as of 6/25/2025   Medication Sig Dispense Refill    ibuprofen (ADVIL,MOTRIN) 800 MG tablet Take 800 mg by mouth every 6 (six) hours as needed for Pain.      guaiFENesin (MUCINEX) 600 mg 12 hr tablet Take 1,200 mg by mouth 2 (two) times daily. (Patient not taking: Reported on 6/25/2025)      minoxidiL (ROGAINE) 2 % external solution Apply topically 2 (two) times daily. 60 mL 0    tiZANidine (ZANAFLEX) 2 MG tablet Take 1 tablet (2 mg total) by mouth every 6 (six) hours as needed (back pain). (Patient not taking: Reported on 6/25/2025) 30 tablet 2     No facility-administered encounter medications on file as of 6/25/2025.

## 2025-06-26 DIAGNOSIS — L65.9 HAIR LOSS: ICD-10-CM

## 2025-06-26 LAB
OHS QRS DURATION: 112 MS
OHS QTC CALCULATION: 391 MS

## 2025-06-26 RX ORDER — MINOXIDIL 2.5 MG/1
2.5 TABLET ORAL DAILY
Qty: 30 TABLET | Refills: 2 | Status: SHIPPED | OUTPATIENT
Start: 2025-06-26 | End: 2025-09-24

## 2025-06-27 ENCOUNTER — RESULTS FOLLOW-UP (OUTPATIENT)
Dept: PRIMARY CARE CLINIC | Facility: CLINIC | Age: 55
End: 2025-06-27

## 2025-06-30 ENCOUNTER — TELEPHONE (OUTPATIENT)
Dept: PRIMARY CARE CLINIC | Facility: CLINIC | Age: 55
End: 2025-06-30
Payer: MEDICAID

## 2025-06-30 NOTE — TELEPHONE ENCOUNTER
Returned call to inform although results have presented in mychart, they have not been reviewed by the provider. Once reviewed, it will present in mychart and you will receive a call informing you of lab results review and plan of care. Please allow time for provider to review he voiced understanding.

## 2025-08-13 ENCOUNTER — OFFICE VISIT (OUTPATIENT)
Dept: CARDIOLOGY | Facility: CLINIC | Age: 55
End: 2025-08-13
Payer: MEDICAID

## 2025-08-13 ENCOUNTER — HOSPITAL ENCOUNTER (OUTPATIENT)
Dept: CARDIOLOGY | Facility: HOSPITAL | Age: 55
Discharge: HOME | End: 2025-08-13
Attending: INTERNAL MEDICINE
Payer: MEDICAID

## 2025-08-13 VITALS
SYSTOLIC BLOOD PRESSURE: 122 MMHG | DIASTOLIC BLOOD PRESSURE: 76 MMHG | BODY MASS INDEX: 37.88 KG/M2 | HEIGHT: 74 IN | WEIGHT: 295.19 LBS | HEART RATE: 64 BPM

## 2025-08-13 DIAGNOSIS — G47.30 SLEEP DISORDER BREATHING: ICD-10-CM

## 2025-08-13 DIAGNOSIS — R07.9 CHEST PAIN SYNDROME: Primary | ICD-10-CM

## 2025-08-13 DIAGNOSIS — K21.9 GASTROESOPHAGEAL REFLUX DISEASE WITHOUT ESOPHAGITIS: ICD-10-CM

## 2025-08-13 DIAGNOSIS — R07.9 CHEST PAIN SYNDROME: ICD-10-CM

## 2025-08-13 DIAGNOSIS — G89.29 CHRONIC BILATERAL LOW BACK PAIN WITH BILATERAL SCIATICA: ICD-10-CM

## 2025-08-13 DIAGNOSIS — E66.2 CLASS 2 OBESITY WITH ALVEOLAR HYPOVENTILATION, SERIOUS COMORBIDITY, AND BODY MASS INDEX (BMI) OF 37.0 TO 37.9 IN ADULT: ICD-10-CM

## 2025-08-13 DIAGNOSIS — R06.83 SNORING: ICD-10-CM

## 2025-08-13 DIAGNOSIS — E66.812 CLASS 2 OBESITY WITH ALVEOLAR HYPOVENTILATION, SERIOUS COMORBIDITY, AND BODY MASS INDEX (BMI) OF 37.0 TO 37.9 IN ADULT: ICD-10-CM

## 2025-08-13 DIAGNOSIS — M54.42 CHRONIC BILATERAL LOW BACK PAIN WITH BILATERAL SCIATICA: ICD-10-CM

## 2025-08-13 DIAGNOSIS — M54.41 CHRONIC BILATERAL LOW BACK PAIN WITH BILATERAL SCIATICA: ICD-10-CM

## 2025-08-13 LAB
OHS QRS DURATION: 100 MS
OHS QTC CALCULATION: 382 MS

## 2025-08-13 PROCEDURE — 99999 PR PBB SHADOW E&M-EST. PATIENT-LVL III: CPT | Mod: PBBFAC,,, | Performed by: INTERNAL MEDICINE

## 2025-08-13 PROCEDURE — 99213 OFFICE O/P EST LOW 20 MIN: CPT | Mod: PBBFAC,25 | Performed by: INTERNAL MEDICINE

## 2025-08-13 PROCEDURE — 93005 ELECTROCARDIOGRAM TRACING: CPT

## 2025-09-05 ENCOUNTER — TELEPHONE (OUTPATIENT)
Dept: CARDIOLOGY | Facility: HOSPITAL | Age: 55
End: 2025-09-05
Payer: MEDICAID

## 2025-09-05 ENCOUNTER — OFFICE VISIT (OUTPATIENT)
Dept: PULMONOLOGY | Facility: CLINIC | Age: 55
End: 2025-09-05
Payer: MEDICAID

## 2025-09-05 VITALS
BODY MASS INDEX: 37.18 KG/M2 | DIASTOLIC BLOOD PRESSURE: 97 MMHG | OXYGEN SATURATION: 97 % | HEIGHT: 74 IN | WEIGHT: 289.69 LBS | HEART RATE: 61 BPM | SYSTOLIC BLOOD PRESSURE: 136 MMHG | RESPIRATION RATE: 14 BRPM

## 2025-09-05 DIAGNOSIS — G47.33 OSA (OBSTRUCTIVE SLEEP APNEA): Primary | ICD-10-CM

## 2025-09-05 PROCEDURE — 99214 OFFICE O/P EST MOD 30 MIN: CPT | Mod: PBBFAC | Performed by: STUDENT IN AN ORGANIZED HEALTH CARE EDUCATION/TRAINING PROGRAM

## 2025-09-05 PROCEDURE — 99999 PR PBB SHADOW E&M-EST. PATIENT-LVL IV: CPT | Mod: PBBFAC,,, | Performed by: STUDENT IN AN ORGANIZED HEALTH CARE EDUCATION/TRAINING PROGRAM
